# Patient Record
Sex: MALE | Race: WHITE | NOT HISPANIC OR LATINO | Employment: OTHER | URBAN - NONMETROPOLITAN AREA
[De-identification: names, ages, dates, MRNs, and addresses within clinical notes are randomized per-mention and may not be internally consistent; named-entity substitution may affect disease eponyms.]

---

## 2023-01-01 ENCOUNTER — APPOINTMENT (OUTPATIENT)
Dept: CT IMAGING | Facility: HOSPITAL | Age: 88
DRG: 64 | End: 2023-01-01
Payer: MEDICARE

## 2023-01-01 ENCOUNTER — APPOINTMENT (OUTPATIENT)
Dept: MRI IMAGING | Facility: HOSPITAL | Age: 88
DRG: 64 | End: 2023-01-01
Payer: MEDICARE

## 2023-01-01 ENCOUNTER — APPOINTMENT (OUTPATIENT)
Dept: CARDIOLOGY | Facility: HOSPITAL | Age: 88
DRG: 64 | End: 2023-01-01
Payer: MEDICARE

## 2023-01-01 ENCOUNTER — HOSPITAL ENCOUNTER (INPATIENT)
Facility: HOSPITAL | Age: 88
LOS: 2 days | DRG: 64 | End: 2023-02-15
Attending: EMERGENCY MEDICINE | Admitting: INTERNAL MEDICINE
Payer: MEDICARE

## 2023-01-01 ENCOUNTER — APPOINTMENT (OUTPATIENT)
Dept: GENERAL RADIOLOGY | Facility: HOSPITAL | Age: 88
DRG: 64 | End: 2023-01-01
Payer: MEDICARE

## 2023-01-01 VITALS
OXYGEN SATURATION: 87 % | BODY MASS INDEX: 23.84 KG/M2 | TEMPERATURE: 98.2 F | HEART RATE: 81 BPM | SYSTOLIC BLOOD PRESSURE: 124 MMHG | DIASTOLIC BLOOD PRESSURE: 90 MMHG | WEIGHT: 179.9 LBS | HEIGHT: 73 IN | RESPIRATION RATE: 8 BRPM

## 2023-01-01 DIAGNOSIS — Z74.09 IMPAIRED MOBILITY: ICD-10-CM

## 2023-01-01 DIAGNOSIS — Z74.09 IMPAIRED MOBILITY AND ADLS: ICD-10-CM

## 2023-01-01 DIAGNOSIS — I69.320 APHASIA DUE TO RECENT CEREBROVASCULAR ACCIDENT (CVA): ICD-10-CM

## 2023-01-01 DIAGNOSIS — Z78.9 IMPAIRED MOBILITY AND ADLS: ICD-10-CM

## 2023-01-01 DIAGNOSIS — R41.82 ALTERED MENTAL STATUS, UNSPECIFIED ALTERED MENTAL STATUS TYPE: ICD-10-CM

## 2023-01-01 DIAGNOSIS — I63.9 CEREBROVASCULAR ACCIDENT (CVA), UNSPECIFIED MECHANISM: Primary | ICD-10-CM

## 2023-01-01 DIAGNOSIS — R13.10 DYSPHAGIA, UNSPECIFIED TYPE: ICD-10-CM

## 2023-01-01 LAB
ABO GROUP BLD: NORMAL
ALBUMIN SERPL-MCNC: 3 G/DL (ref 3.5–5.2)
ALBUMIN SERPL-MCNC: 3.3 G/DL (ref 3.5–5.2)
ALBUMIN SERPL-MCNC: 3.6 G/DL (ref 3.5–5.2)
ALBUMIN/GLOB SERPL: 1.1 G/DL
ALBUMIN/GLOB SERPL: 1.3 G/DL
ALBUMIN/GLOB SERPL: 1.6 G/DL
ALP SERPL-CCNC: 79 U/L (ref 39–117)
ALP SERPL-CCNC: 83 U/L (ref 39–117)
ALP SERPL-CCNC: 95 U/L (ref 39–117)
ALT SERPL W P-5'-P-CCNC: 5 U/L (ref 1–41)
ALT SERPL W P-5'-P-CCNC: 5 U/L (ref 1–41)
ALT SERPL W P-5'-P-CCNC: 6 U/L (ref 1–41)
ANION GAP SERPL CALCULATED.3IONS-SCNC: 14 MMOL/L (ref 5–15)
ANION GAP SERPL CALCULATED.3IONS-SCNC: 14 MMOL/L (ref 5–15)
ANION GAP SERPL CALCULATED.3IONS-SCNC: 15 MMOL/L (ref 5–15)
ANISOCYTOSIS BLD QL: ABNORMAL
AST SERPL-CCNC: 20 U/L (ref 1–40)
AST SERPL-CCNC: 20 U/L (ref 1–40)
AST SERPL-CCNC: 28 U/L (ref 1–40)
BH CV ECHO MEAS - AO MAX PG: 3 MMHG
BH CV ECHO MEAS - AO MEAN PG: 1 MMHG
BH CV ECHO MEAS - AO ROOT DIAM: 3.3 CM
BH CV ECHO MEAS - AO V2 MAX: 86.2 CM/SEC
BH CV ECHO MEAS - AO V2 VTI: 13.5 CM
BH CV ECHO MEAS - AVA(I,D): 2.7 CM2
BH CV ECHO MEAS - EDV(MOD-SP4): 61.8 ML
BH CV ECHO MEAS - EF(MOD-SP4): 72.3 %
BH CV ECHO MEAS - ESV(MOD-SP4): 17.1 ML
BH CV ECHO MEAS - LA DIMENSION: 3.1 CM
BH CV ECHO MEAS - LAT PEAK E' VEL: 5.1 CM/SEC
BH CV ECHO MEAS - LV DIASTOLIC VOL/BSA (35-75): 30.1 CM2
BH CV ECHO MEAS - LV MAX PG: 2.5 MMHG
BH CV ECHO MEAS - LV MEAN PG: 1 MMHG
BH CV ECHO MEAS - LV SYSTOLIC VOL/BSA (12-30): 8.3 CM2
BH CV ECHO MEAS - LV V1 MAX: 79.1 CM/SEC
BH CV ECHO MEAS - LV V1 VTI: 11.8 CM
BH CV ECHO MEAS - LVOT AREA: 3.1 CM2
BH CV ECHO MEAS - LVOT DIAM: 2 CM
BH CV ECHO MEAS - MED PEAK E' VEL: 5.7 CM/SEC
BH CV ECHO MEAS - MV E MAX VEL: 70.3 CM/SEC
BH CV ECHO MEAS - RAP SYSTOLE: 5 MMHG
BH CV ECHO MEAS - RVSP: 26.3 MMHG
BH CV ECHO MEAS - SI(MOD-SP4): 21.8 ML/M2
BH CV ECHO MEAS - SV(LVOT): 37.1 ML
BH CV ECHO MEAS - SV(MOD-SP4): 44.7 ML
BH CV ECHO MEAS - TAPSE (>1.6): 1.01 CM
BH CV ECHO MEAS - TR MAX PG: 21.3 MMHG
BH CV ECHO MEAS - TR MAX VEL: 231 CM/SEC
BH CV ECHO MEASUREMENTS AVERAGE E/E' RATIO: 13.02
BH CV VAS BP LEFT ARM: NORMAL MMHG
BH CV XLRA - RV BASE: 5.8 CM
BH CV XLRA - RV LENGTH: 7.8 CM
BH CV XLRA - RV MID: 4.8 CM
BH CV XLRA - TDI S': 4.8 CM/SEC
BILIRUB SERPL-MCNC: 2.1 MG/DL (ref 0–1.2)
BILIRUB SERPL-MCNC: 2.4 MG/DL (ref 0–1.2)
BILIRUB SERPL-MCNC: 2.5 MG/DL (ref 0–1.2)
BLD GP AB SCN SERPL QL: NEGATIVE
BUN SERPL-MCNC: 25 MG/DL (ref 8–23)
BUN SERPL-MCNC: 31 MG/DL (ref 8–23)
BUN SERPL-MCNC: 32 MG/DL (ref 8–23)
BUN/CREAT SERPL: 28.4 (ref 7–25)
BUN/CREAT SERPL: 30.2 (ref 7–25)
BUN/CREAT SERPL: 31 (ref 7–25)
BURR CELLS BLD QL SMEAR: ABNORMAL
CALCIUM SPEC-SCNC: 8 MG/DL (ref 8.6–10.5)
CALCIUM SPEC-SCNC: 8.4 MG/DL (ref 8.6–10.5)
CALCIUM SPEC-SCNC: 8.5 MG/DL (ref 8.6–10.5)
CHLORIDE SERPL-SCNC: 107 MMOL/L (ref 98–107)
CHLORIDE SERPL-SCNC: 112 MMOL/L (ref 98–107)
CHLORIDE SERPL-SCNC: 114 MMOL/L (ref 98–107)
CHOLEST SERPL-MCNC: 95 MG/DL (ref 0–200)
CO2 SERPL-SCNC: 21 MMOL/L (ref 22–29)
CO2 SERPL-SCNC: 23 MMOL/L (ref 22–29)
CO2 SERPL-SCNC: 24 MMOL/L (ref 22–29)
CREAT SERPL-MCNC: 0.88 MG/DL (ref 0.76–1.27)
CREAT SERPL-MCNC: 1 MG/DL (ref 0.76–1.27)
CREAT SERPL-MCNC: 1.06 MG/DL (ref 0.76–1.27)
D DIMER PPP FEU-MCNC: >20 MCGFEU/ML (ref 0–0.85)
DEPRECATED RDW RBC AUTO: 54.9 FL (ref 37–54)
DEPRECATED RDW RBC AUTO: 55.7 FL (ref 37–54)
DEPRECATED RDW RBC AUTO: 55.8 FL (ref 37–54)
EGFRCR SERPLBLD CKD-EPI 2021: 68.8 ML/MIN/1.73
EGFRCR SERPLBLD CKD-EPI 2021: 73.8 ML/MIN/1.73
EGFRCR SERPLBLD CKD-EPI 2021: 84.3 ML/MIN/1.73
EOSINOPHIL # BLD MANUAL: 0.17 10*3/MM3 (ref 0–0.4)
EOSINOPHIL NFR BLD MANUAL: 1 % (ref 0.3–6.2)
ERYTHROCYTE [DISTWIDTH] IN BLOOD BY AUTOMATED COUNT: 14 % (ref 12.3–15.4)
ERYTHROCYTE [DISTWIDTH] IN BLOOD BY AUTOMATED COUNT: 14.1 % (ref 12.3–15.4)
ERYTHROCYTE [DISTWIDTH] IN BLOOD BY AUTOMATED COUNT: 14.2 % (ref 12.3–15.4)
GEN 5 2HR TROPONIN T REFLEX: 74 NG/L
GLOBULIN UR ELPH-MCNC: 2.1 GM/DL
GLOBULIN UR ELPH-MCNC: 2.7 GM/DL
GLOBULIN UR ELPH-MCNC: 2.8 GM/DL
GLUCOSE BLDC GLUCOMTR-MCNC: 112 MG/DL (ref 70–130)
GLUCOSE BLDC GLUCOMTR-MCNC: 112 MG/DL (ref 70–130)
GLUCOSE BLDC GLUCOMTR-MCNC: 120 MG/DL (ref 70–130)
GLUCOSE BLDC GLUCOMTR-MCNC: 127 MG/DL (ref 70–130)
GLUCOSE BLDC GLUCOMTR-MCNC: 133 MG/DL (ref 70–130)
GLUCOSE SERPL-MCNC: 109 MG/DL (ref 65–99)
GLUCOSE SERPL-MCNC: 136 MG/DL (ref 65–99)
GLUCOSE SERPL-MCNC: 143 MG/DL (ref 65–99)
HBA1C MFR BLD: 5.4 % (ref 4.8–5.6)
HCT VFR BLD AUTO: 39.7 % (ref 37.5–51)
HCT VFR BLD AUTO: 40.9 % (ref 37.5–51)
HCT VFR BLD AUTO: 44.9 % (ref 37.5–51)
HDLC SERPL-MCNC: 25 MG/DL (ref 40–60)
HGB BLD-MCNC: 13.2 G/DL (ref 13–17.7)
HGB BLD-MCNC: 13.6 G/DL (ref 13–17.7)
HGB BLD-MCNC: 14.8 G/DL (ref 13–17.7)
HOLD SPECIMEN: NORMAL
INR PPP: 1.24 (ref 0.91–1.09)
LDLC SERPL CALC-MCNC: 49 MG/DL (ref 0–100)
LDLC/HDLC SERPL: 1.91 {RATIO}
LEFT ATRIUM VOLUME: 95.7 ML
LYMPHOCYTES # BLD MANUAL: 0 10*3/MM3 (ref 0.7–3.1)
LYMPHOCYTES NFR BLD MANUAL: 9.9 % (ref 5–12)
MACROCYTES BLD QL SMEAR: ABNORMAL
MAXIMAL PREDICTED HEART RATE: 135 BPM
MCH RBC QN AUTO: 35.7 PG (ref 26.6–33)
MCH RBC QN AUTO: 35.9 PG (ref 26.6–33)
MCH RBC QN AUTO: 36 PG (ref 26.6–33)
MCHC RBC AUTO-ENTMCNC: 33 G/DL (ref 31.5–35.7)
MCHC RBC AUTO-ENTMCNC: 33.2 G/DL (ref 31.5–35.7)
MCHC RBC AUTO-ENTMCNC: 33.3 G/DL (ref 31.5–35.7)
MCV RBC AUTO: 107.9 FL (ref 79–97)
MCV RBC AUTO: 108.2 FL (ref 79–97)
MCV RBC AUTO: 108.5 FL (ref 79–97)
METAMYELOCYTES NFR BLD MANUAL: 2 % (ref 0–0)
MONOCYTES # BLD: 1.67 10*3/MM3 (ref 0.1–0.9)
MYELOCYTES NFR BLD MANUAL: 1 % (ref 0–0)
NEUTROPHILS # BLD AUTO: 14.52 10*3/MM3 (ref 1.7–7)
NEUTROPHILS NFR BLD MANUAL: 85.1 % (ref 42.7–76)
NEUTS BAND NFR BLD MANUAL: 1 % (ref 0–5)
PLAT MORPH BLD: NORMAL
PLATELET # BLD AUTO: 123 10*3/MM3 (ref 140–450)
PLATELET # BLD AUTO: 126 10*3/MM3 (ref 140–450)
PLATELET # BLD AUTO: 163 10*3/MM3 (ref 140–450)
PMV BLD AUTO: 10.2 FL (ref 6–12)
PMV BLD AUTO: 10.5 FL (ref 6–12)
PMV BLD AUTO: 10.7 FL (ref 6–12)
POTASSIUM SERPL-SCNC: 3 MMOL/L (ref 3.5–5.2)
POTASSIUM SERPL-SCNC: 3.5 MMOL/L (ref 3.5–5.2)
POTASSIUM SERPL-SCNC: 3.5 MMOL/L (ref 3.5–5.2)
PROT SERPL-MCNC: 5.4 G/DL (ref 6–8.5)
PROT SERPL-MCNC: 5.8 G/DL (ref 6–8.5)
PROT SERPL-MCNC: 6.3 G/DL (ref 6–8.5)
PROTHROMBIN TIME: 15.7 SECONDS (ref 11.8–14.8)
QT INTERVAL: 382 MS
QTC INTERVAL: 480 MS
RBC # BLD AUTO: 3.68 10*6/MM3 (ref 4.14–5.8)
RBC # BLD AUTO: 3.78 10*6/MM3 (ref 4.14–5.8)
RBC # BLD AUTO: 4.14 10*6/MM3 (ref 4.14–5.8)
RH BLD: POSITIVE
SODIUM SERPL-SCNC: 145 MMOL/L (ref 136–145)
SODIUM SERPL-SCNC: 149 MMOL/L (ref 136–145)
SODIUM SERPL-SCNC: 150 MMOL/L (ref 136–145)
STRESS TARGET HR: 115 BPM
T&S EXPIRATION DATE: NORMAL
TRIGL SERPL-MCNC: 111 MG/DL (ref 0–150)
TROPONIN T DELTA: 5 NG/L
TROPONIN T SERPL HS-MCNC: 69 NG/L
VARIANT LYMPHS NFR BLD MANUAL: 0 % (ref 19.6–45.3)
VLDLC SERPL-MCNC: 21 MG/DL (ref 5–40)
WBC MORPH BLD: NORMAL
WBC NRBC COR # BLD: 11.56 10*3/MM3 (ref 3.4–10.8)
WBC NRBC COR # BLD: 12.78 10*3/MM3 (ref 3.4–10.8)
WBC NRBC COR # BLD: 16.86 10*3/MM3 (ref 3.4–10.8)

## 2023-01-01 PROCEDURE — 25010000002 PERFLUTREN 6.52 MG/ML SUSPENSION: Performed by: INTERNAL MEDICINE

## 2023-01-01 PROCEDURE — 92523 SPEECH SOUND LANG COMPREHEN: CPT

## 2023-01-01 PROCEDURE — 93010 ELECTROCARDIOGRAM REPORT: CPT | Performed by: INTERNAL MEDICINE

## 2023-01-01 PROCEDURE — 85007 BL SMEAR W/DIFF WBC COUNT: CPT | Performed by: EMERGENCY MEDICINE

## 2023-01-01 PROCEDURE — 85379 FIBRIN DEGRADATION QUANT: CPT | Performed by: NURSE PRACTITIONER

## 2023-01-01 PROCEDURE — 92610 EVALUATE SWALLOWING FUNCTION: CPT

## 2023-01-01 PROCEDURE — 70450 CT HEAD/BRAIN W/O DYE: CPT

## 2023-01-01 PROCEDURE — 99285 EMERGENCY DEPT VISIT HI MDM: CPT

## 2023-01-01 PROCEDURE — 93306 TTE W/DOPPLER COMPLETE: CPT | Performed by: INTERNAL MEDICINE

## 2023-01-01 PROCEDURE — 82962 GLUCOSE BLOOD TEST: CPT

## 2023-01-01 PROCEDURE — 93005 ELECTROCARDIOGRAM TRACING: CPT | Performed by: EMERGENCY MEDICINE

## 2023-01-01 PROCEDURE — 97162 PT EVAL MOD COMPLEX 30 MIN: CPT | Performed by: PHYSICAL THERAPIST

## 2023-01-01 PROCEDURE — 83036 HEMOGLOBIN GLYCOSYLATED A1C: CPT | Performed by: INTERNAL MEDICINE

## 2023-01-01 PROCEDURE — 71275 CT ANGIOGRAPHY CHEST: CPT

## 2023-01-01 PROCEDURE — 84484 ASSAY OF TROPONIN QUANT: CPT | Performed by: EMERGENCY MEDICINE

## 2023-01-01 PROCEDURE — 0 IOPAMIDOL PER 1 ML: Performed by: EMERGENCY MEDICINE

## 2023-01-01 PROCEDURE — 99233 SBSQ HOSP IP/OBS HIGH 50: CPT | Performed by: INTERNAL MEDICINE

## 2023-01-01 PROCEDURE — 97166 OT EVAL MOD COMPLEX 45 MIN: CPT | Performed by: OCCUPATIONAL THERAPIST

## 2023-01-01 PROCEDURE — 70498 CT ANGIOGRAPHY NECK: CPT

## 2023-01-01 PROCEDURE — P9612 CATHETERIZE FOR URINE SPEC: HCPCS

## 2023-01-01 PROCEDURE — 85027 COMPLETE CBC AUTOMATED: CPT | Performed by: INTERNAL MEDICINE

## 2023-01-01 PROCEDURE — 36415 COLL VENOUS BLD VENIPUNCTURE: CPT

## 2023-01-01 PROCEDURE — 99222 1ST HOSP IP/OBS MODERATE 55: CPT | Performed by: NURSE PRACTITIONER

## 2023-01-01 PROCEDURE — 0 IOPAMIDOL PER 1 ML: Performed by: INTERNAL MEDICINE

## 2023-01-01 PROCEDURE — 80053 COMPREHEN METABOLIC PANEL: CPT | Performed by: INTERNAL MEDICINE

## 2023-01-01 PROCEDURE — 85610 PROTHROMBIN TIME: CPT | Performed by: EMERGENCY MEDICINE

## 2023-01-01 PROCEDURE — 71045 X-RAY EXAM CHEST 1 VIEW: CPT

## 2023-01-01 PROCEDURE — 97535 SELF CARE MNGMENT TRAINING: CPT

## 2023-01-01 PROCEDURE — 99233 SBSQ HOSP IP/OBS HIGH 50: CPT | Performed by: PSYCHIATRY & NEUROLOGY

## 2023-01-01 PROCEDURE — 99233 SBSQ HOSP IP/OBS HIGH 50: CPT | Performed by: PHYSICIAN ASSISTANT

## 2023-01-01 PROCEDURE — 93306 TTE W/DOPPLER COMPLETE: CPT

## 2023-01-01 PROCEDURE — 85025 COMPLETE CBC W/AUTO DIFF WBC: CPT | Performed by: EMERGENCY MEDICINE

## 2023-01-01 PROCEDURE — 97116 GAIT TRAINING THERAPY: CPT

## 2023-01-01 PROCEDURE — 0 POTASSIUM CHLORIDE 10 MEQ/100ML SOLUTION: Performed by: NURSE PRACTITIONER

## 2023-01-01 PROCEDURE — 92526 ORAL FUNCTION THERAPY: CPT

## 2023-01-01 PROCEDURE — 86850 RBC ANTIBODY SCREEN: CPT | Performed by: EMERGENCY MEDICINE

## 2023-01-01 PROCEDURE — 80053 COMPREHEN METABOLIC PANEL: CPT | Performed by: EMERGENCY MEDICINE

## 2023-01-01 PROCEDURE — 97110 THERAPEUTIC EXERCISES: CPT

## 2023-01-01 PROCEDURE — 70496 CT ANGIOGRAPHY HEAD: CPT

## 2023-01-01 PROCEDURE — 86901 BLOOD TYPING SEROLOGIC RH(D): CPT | Performed by: EMERGENCY MEDICINE

## 2023-01-01 PROCEDURE — 80061 LIPID PANEL: CPT | Performed by: INTERNAL MEDICINE

## 2023-01-01 PROCEDURE — 86900 BLOOD TYPING SEROLOGIC ABO: CPT | Performed by: EMERGENCY MEDICINE

## 2023-01-01 PROCEDURE — 70551 MRI BRAIN STEM W/O DYE: CPT

## 2023-01-01 PROCEDURE — 99291 CRITICAL CARE FIRST HOUR: CPT | Performed by: PSYCHIATRY & NEUROLOGY

## 2023-01-01 RX ORDER — POTASSIUM CHLORIDE 7.45 MG/ML
10 INJECTION INTRAVENOUS
Status: DISCONTINUED | OUTPATIENT
Start: 2023-01-01 | End: 2023-01-01

## 2023-01-01 RX ORDER — LORAZEPAM 1 MG/1
1 TABLET ORAL
Status: DISCONTINUED | OUTPATIENT
Start: 2023-01-01 | End: 2023-02-16 | Stop reason: HOSPADM

## 2023-01-01 RX ORDER — ATROPINE SULFATE 10 MG/ML
2 SOLUTION/ DROPS OPHTHALMIC 2 TIMES DAILY PRN
Status: DISCONTINUED | OUTPATIENT
Start: 2023-01-01 | End: 2023-02-16 | Stop reason: HOSPADM

## 2023-01-01 RX ORDER — LORAZEPAM 0.5 MG/1
0.5 TABLET ORAL
Status: DISCONTINUED | OUTPATIENT
Start: 2023-01-01 | End: 2023-02-16 | Stop reason: HOSPADM

## 2023-01-01 RX ORDER — MORPHINE SULFATE 20 MG/ML
20 SOLUTION ORAL
Status: DISCONTINUED | OUTPATIENT
Start: 2023-01-01 | End: 2023-02-16 | Stop reason: HOSPADM

## 2023-01-01 RX ORDER — DEXTROSE MONOHYDRATE 50 MG/ML
100 INJECTION, SOLUTION INTRAVENOUS CONTINUOUS
Status: DISCONTINUED | OUTPATIENT
Start: 2023-01-01 | End: 2023-01-01

## 2023-01-01 RX ORDER — SODIUM CHLORIDE 0.9 % (FLUSH) 0.9 %
10 SYRINGE (ML) INJECTION AS NEEDED
Status: DISCONTINUED | OUTPATIENT
Start: 2023-01-01 | End: 2023-02-16 | Stop reason: HOSPADM

## 2023-01-01 RX ORDER — LORAZEPAM 2 MG/ML
1 INJECTION INTRAMUSCULAR
Status: DISCONTINUED | OUTPATIENT
Start: 2023-01-01 | End: 2023-02-16 | Stop reason: HOSPADM

## 2023-01-01 RX ORDER — ASPIRIN 81 MG/1
81 TABLET, CHEWABLE ORAL 2 TIMES DAILY
COMMUNITY

## 2023-01-01 RX ORDER — ACETAMINOPHEN 325 MG/1
650 TABLET ORAL EVERY 6 HOURS PRN
COMMUNITY

## 2023-01-01 RX ORDER — LORAZEPAM 2 MG/ML
0.5 INJECTION INTRAMUSCULAR
Status: DISCONTINUED | OUTPATIENT
Start: 2023-01-01 | End: 2023-02-16 | Stop reason: HOSPADM

## 2023-01-01 RX ORDER — SODIUM CHLORIDE 0.9 % (FLUSH) 0.9 %
10 SYRINGE (ML) INJECTION EVERY 12 HOURS SCHEDULED
Status: DISCONTINUED | OUTPATIENT
Start: 2023-01-01 | End: 2023-02-16 | Stop reason: HOSPADM

## 2023-01-01 RX ORDER — ACETAMINOPHEN 325 MG/1
650 TABLET ORAL EVERY 4 HOURS PRN
Status: DISCONTINUED | OUTPATIENT
Start: 2023-01-01 | End: 2023-02-16 | Stop reason: HOSPADM

## 2023-01-01 RX ORDER — ONDANSETRON 2 MG/ML
4 INJECTION INTRAMUSCULAR; INTRAVENOUS EVERY 6 HOURS PRN
Status: DISCONTINUED | OUTPATIENT
Start: 2023-01-01 | End: 2023-02-16 | Stop reason: HOSPADM

## 2023-01-01 RX ORDER — DEXTROSE AND SODIUM CHLORIDE 5; .9 G/100ML; G/100ML
75 INJECTION, SOLUTION INTRAVENOUS CONTINUOUS
Status: DISCONTINUED | OUTPATIENT
Start: 2023-01-01 | End: 2023-01-01

## 2023-01-01 RX ORDER — LORAZEPAM 2 MG/ML
1 CONCENTRATE ORAL
Status: DISCONTINUED | OUTPATIENT
Start: 2023-01-01 | End: 2023-02-16 | Stop reason: HOSPADM

## 2023-01-01 RX ORDER — LORAZEPAM 1 MG/1
2 TABLET ORAL
Status: DISCONTINUED | OUTPATIENT
Start: 2023-01-01 | End: 2023-02-16 | Stop reason: HOSPADM

## 2023-01-01 RX ORDER — LORAZEPAM 2 MG/ML
2 CONCENTRATE ORAL
Status: DISCONTINUED | OUTPATIENT
Start: 2023-01-01 | End: 2023-02-16 | Stop reason: HOSPADM

## 2023-01-01 RX ORDER — AMLODIPINE BESYLATE 10 MG/1
10 TABLET ORAL DAILY
COMMUNITY

## 2023-01-01 RX ORDER — LORAZEPAM 2 MG/ML
0.5 CONCENTRATE ORAL
Status: DISCONTINUED | OUTPATIENT
Start: 2023-01-01 | End: 2023-02-16 | Stop reason: HOSPADM

## 2023-01-01 RX ORDER — ACETAMINOPHEN 650 MG/1
650 SUPPOSITORY RECTAL EVERY 4 HOURS PRN
Status: DISCONTINUED | OUTPATIENT
Start: 2023-01-01 | End: 2023-02-16 | Stop reason: HOSPADM

## 2023-01-01 RX ORDER — LORAZEPAM 2 MG/ML
2 INJECTION INTRAMUSCULAR
Status: DISCONTINUED | OUTPATIENT
Start: 2023-01-01 | End: 2023-02-16 | Stop reason: HOSPADM

## 2023-01-01 RX ORDER — ASPIRIN 81 MG/1
81 TABLET, CHEWABLE ORAL DAILY
Status: ON HOLD | COMMUNITY
End: 2023-01-01 | Stop reason: DRUGHIGH

## 2023-01-01 RX ORDER — SODIUM CHLORIDE 9 MG/ML
40 INJECTION, SOLUTION INTRAVENOUS AS NEEDED
Status: DISCONTINUED | OUTPATIENT
Start: 2023-01-01 | End: 2023-02-16 | Stop reason: HOSPADM

## 2023-01-01 RX ORDER — ACETAMINOPHEN 160 MG/5ML
650 SOLUTION ORAL EVERY 4 HOURS PRN
Status: DISCONTINUED | OUTPATIENT
Start: 2023-01-01 | End: 2023-02-16 | Stop reason: HOSPADM

## 2023-01-01 RX ORDER — SCOLOPAMINE TRANSDERMAL SYSTEM 1 MG/1
1 PATCH, EXTENDED RELEASE TRANSDERMAL
Status: DISCONTINUED | OUTPATIENT
Start: 2023-01-01 | End: 2023-02-16 | Stop reason: HOSPADM

## 2023-01-01 RX ORDER — ASPIRIN 300 MG/1
300 SUPPOSITORY RECTAL DAILY
Status: DISCONTINUED | OUTPATIENT
Start: 2023-01-01 | End: 2023-01-01

## 2023-01-01 RX ORDER — DIPHENOXYLATE HYDROCHLORIDE AND ATROPINE SULFATE 2.5; .025 MG/1; MG/1
1 TABLET ORAL
Status: DISCONTINUED | OUTPATIENT
Start: 2023-01-01 | End: 2023-02-16 | Stop reason: HOSPADM

## 2023-01-01 RX ORDER — METOPROLOL SUCCINATE 25 MG/1
25 TABLET, EXTENDED RELEASE ORAL DAILY
COMMUNITY

## 2023-01-01 RX ORDER — ATORVASTATIN CALCIUM 40 MG/1
80 TABLET, FILM COATED ORAL NIGHTLY
Status: DISCONTINUED | OUTPATIENT
Start: 2023-01-01 | End: 2023-01-01

## 2023-01-01 RX ORDER — DEXTROSE, SODIUM CHLORIDE, AND POTASSIUM CHLORIDE 5; .45; .15 G/100ML; G/100ML; G/100ML
100 INJECTION INTRAVENOUS CONTINUOUS
Status: DISCONTINUED | OUTPATIENT
Start: 2023-01-01 | End: 2023-01-01

## 2023-01-01 RX ORDER — ASPIRIN 325 MG
325 TABLET ORAL DAILY
Status: DISCONTINUED | OUTPATIENT
Start: 2023-01-01 | End: 2023-01-01

## 2023-01-01 RX ADMIN — Medication 10 ML: at 20:45

## 2023-01-01 RX ADMIN — POTASSIUM CHLORIDE 10 MEQ: 7.46 INJECTION, SOLUTION INTRAVENOUS at 10:46

## 2023-01-01 RX ADMIN — POTASSIUM CHLORIDE 10 MEQ: 7.46 INJECTION, SOLUTION INTRAVENOUS at 10:06

## 2023-01-01 RX ADMIN — Medication 10 ML: at 20:10

## 2023-01-01 RX ADMIN — POTASSIUM CHLORIDE 10 MEQ: 7.46 INJECTION, SOLUTION INTRAVENOUS at 11:41

## 2023-01-01 RX ADMIN — ASPIRIN 300 MG: 300 SUPPOSITORY RECTAL at 10:06

## 2023-01-01 RX ADMIN — ASPIRIN 300 MG: 300 SUPPOSITORY RECTAL at 11:18

## 2023-01-01 RX ADMIN — IOPAMIDOL 125 ML: 755 INJECTION, SOLUTION INTRAVENOUS at 09:47

## 2023-01-01 RX ADMIN — IOPAMIDOL 100 ML: 755 INJECTION, SOLUTION INTRAVENOUS at 16:35

## 2023-01-01 RX ADMIN — ASPIRIN 300 MG: 300 SUPPOSITORY RECTAL at 18:06

## 2023-01-01 RX ADMIN — DEXTROSE MONOHYDRATE 50 ML/HR: 50 INJECTION, SOLUTION INTRAVENOUS at 14:52

## 2023-01-01 RX ADMIN — DEXTROSE AND SODIUM CHLORIDE 75 ML/HR: 5; 900 INJECTION, SOLUTION INTRAVENOUS at 18:06

## 2023-01-01 RX ADMIN — PERFLUTREN 13.04 MG: 6.52 INJECTION, SUSPENSION INTRAVENOUS at 16:53

## 2023-01-01 RX ADMIN — DEXTROSE AND SODIUM CHLORIDE 75 ML/HR: 5; 900 INJECTION, SOLUTION INTRAVENOUS at 07:01

## 2023-02-13 PROBLEM — I63.9 CEREBROVASCULAR ACCIDENT (CVA), UNSPECIFIED MECHANISM: Status: ACTIVE | Noted: 2023-01-01

## 2023-02-13 NOTE — H&P
[facility] ER Medicine Services  HISTORY AND PHYSICAL    Date of Admission: 2/13/2023  Primary Care Physician: Gopi Chino MD    Subjective   Primary Historian: Patient's wife    Chief Complaint: Confusion, right arm weakness    History of Present Illness  The patient is an 87-year-old man with a past medical history of essential hypertension and recent right hip fracture repair 2 weeks prior to presentation, who was having acute rehab at the [facility] in [place].  He presented with sudden onset of right arm weakness and aphasia this morning.  History was obtained from review of ER records and patient's wife as patient is aphasic at the time of my evaluation.    This morning, at the [facility], patient was noticed to have sudden onset of confusion and right arm weakness around 7:15 AM today.  He was brought to our facility and his CT of the brain showed features consistent with a left MCA stroke.  He was reviewed by neurology and was deemed not to be a tPA candidate.  CTA of the head and neck showed poor progression of contrast likely secondary to low ejection fraction.  He was recommended for admission.    Review of Systems   Otherwise complete ROS reviewed and negative except as mentioned in the HPI.    Past Medical History:   Past Medical History:   Diagnosis Date   • Hypertension      Past Surgical History:  Past Surgical History:   Procedure Laterality Date   • ORIF FEMUR FRACTURE       Social History:  reports that he has never smoked. He has never been exposed to tobacco smoke. He has never used smokeless tobacco. He reports that he does not drink alcohol and does not use drugs.    Family History: family history includes No Known Problems in his father and mother.      Allergies:  Allergies   Allergen Reactions   • Penicillins Unknown - Low Severity       Medications:  Prior to Admission medications    No known  Medications     I have  "utilized all available immediate resources to obtain, update, or review the patient's current medications (including all prescriptions, over-the-counter products, herbals, cannabis/cannabidiol products, and vitamin/mineral/dietary (nutritional) supplements).    Objective     Vital Signs: /74 (BP Location: Left arm, Patient Position: Lying)   Pulse 77   Temp 97.8 °F (36.6 °C) (Axillary)   Resp 16   Ht 185.4 cm (73\")   Wt 81.6 kg (179 lb 14.4 oz)   SpO2 96%   BMI 23.73 kg/m²   Physical Exam  Constitutional:       General: He is not in acute distress.     Appearance: He is well-developed. He is not diaphoretic.   HENT:      Head: Normocephalic and atraumatic.   Eyes:      General: No scleral icterus.     Conjunctiva/sclera: Conjunctivae normal.      Pupils: Pupils are equal, round, and reactive to light.   Neck:      Thyroid: No thyromegaly.      Vascular: No JVD.      Trachea: No tracheal deviation.   Cardiovascular:      Rate and Rhythm: Normal rate and regular rhythm.      Heart sounds: Normal heart sounds. No murmur heard.    No friction rub. No gallop.   Pulmonary:      Effort: Pulmonary effort is normal. No respiratory distress.      Breath sounds: Normal breath sounds. No stridor. No wheezing or rales.   Abdominal:      General: Bowel sounds are normal. There is no distension.      Palpations: Abdomen is soft. There is no mass.      Tenderness: There is no abdominal tenderness. There is no guarding or rebound.      Hernia: No hernia is present.   Musculoskeletal:         General: No tenderness or deformity. Normal range of motion.      Right lower leg: No edema.      Left lower leg: No edema.   Lymphadenopathy:      Cervical: No cervical adenopathy.   Skin:     General: Skin is warm and dry.      Coloration: Skin is not pale.      Findings: No erythema or rash.   Neurological:      Motor: No abnormal muscle tone.      Comments: Expressive and receptive aphasia noted.  Confused.  Tongue deviated to " left side.  Right arm and leg weakness noted-grade 2/5 power.  Normal power in left arm and leg.   Psychiatric:      Comments: Expressive and receptive aphasia noted.  Confused.        Results Reviewed:  Lab Results (last 24 hours)     Procedure Component Value Units Date/Time    POC Glucose Once [[id number]]  (Normal) Collected: 02/13/23 1822    Specimen: Blood Updated: 02/13/23 1832     Glucose 120 mg/dL      Comment: : sarika Bright MeganMeter ID: [id number]       [place] Draw [[id number]] Collected: 02/13/23 0902    Specimen: Blood Updated: 02/13/23 1315    Narrative:      The following orders were created for panel order [place] Draw.  Procedure                               Abnormality         Status                     ---------                               -----------         ------                     Green Top (Gel)[[id number]]                                  Final result               Lavender Top[[id number]]                                                                Red Top[[id number]]                                          Final result               Gray Top[[id number]]                                         Final result               Light Blue Top[[id number]]                                                                Please view results for these tests on the individual orders.    [place] Top [[id number]] Collected: 02/13/23 0902    Specimen: Blood Updated: 02/13/23 1315     Extra Tube Hold for add-ons.     Comment: Auto resulted.       High Sensitivity Troponin T 2Hr [[id number]]  (Abnormal) Collected: 02/13/23 1106    Specimen: Blood Updated: 02/13/23 1138     HS Troponin T 74 ng/L      Troponin T Delta 5 ng/L     Narrative:      High Sensitive Troponin T Reference Range:  <10.0 ng/L- Negative Female for AMI  <15.0 ng/L- Negative Male for AMI  >=10 - Abnormal Female indicating possible myocardial injury.  >=15 - Abnormal Male indicating possible myocardial injury.   Clinicians would have  to utilize clinical acumen, EKG, Troponin, and serial changes to determine if it is an Acute Myocardial Infarction or myocardial injury due to an underlying chronic condition.         High Sensitivity Troponin T [[id number]]  (Abnormal) Collected: 02/13/23 0902    Specimen: Blood Updated: 02/13/23 1050     HS Troponin T 69 ng/L     Narrative:      High Sensitive Troponin T Reference Range:  <10.0 ng/L- Negative Female for AMI  <15.0 ng/L- Negative Male for AMI  >=10 - Abnormal Female indicating possible myocardial injury.  >=15 - Abnormal Male indicating possible myocardial injury.   Clinicians would have to utilize clinical acumen, EKG, Troponin, and serial changes to determine if it is an Acute Myocardial Infarction or myocardial injury due to an underlying chronic condition.         Green Top (Gel) [[id number]] Collected: 02/13/23 0902    Specimen: Blood Updated: 02/13/23 1045     Extra Tube Hold for add-ons.     Comment: Auto resulted.       POC Glucose Once [[id number]]  (Normal) Collected: 02/13/23 1018    Specimen: Blood Updated: 02/13/23 1031     Glucose 127 mg/dL      Comment: : rgramlis Gramlisch RobertMeter ID: [id number]       Red Top [[id number]] Collected: 02/13/23 0902    Specimen: Blood Updated: 02/13/23 1016     Extra Tube Hold for add-ons.     Comment: Auto resulted.       CBC & Differential [[id number]]  (Abnormal) Collected: 02/13/23 0902    Specimen: Blood Updated: 02/13/23 0950    Narrative:      The following orders were created for panel order CBC & Differential.  Procedure                               Abnormality         Status                     ---------                               -----------         ------                     CBC Auto Differential[[id number]]        Abnormal            Final result                 Please view results for these tests on the individual orders.    CBC Auto Differential [[id number]]  (Abnormal) Collected: 02/13/23 0902    Specimen: Blood Updated:  02/13/23 0950     WBC 16.86 10*3/mm3      RBC 4.14 10*6/mm3      Hemoglobin 14.8 g/dL      Hematocrit 44.9 %      .5 fL      MCH 35.7 pg      MCHC 33.0 g/dL      RDW 14.2 %      RDW-SD 55.8 fl      MPV 10.2 fL      Platelets 163 10*3/mm3     Manual Differential [[id number]]  (Abnormal) Collected: 02/13/23 0902    Specimen: Blood Updated: 02/13/23 0950     Neutrophil % 85.1 %      Lymphocyte % 0.0 %      Monocyte % 9.9 %      Eosinophil % 1.0 %      Bands %  1.0 %      Metamyelocyte % 2.0 %      Myelocyte % 1.0 %      Neutrophils Absolute 14.52 10*3/mm3      Lymphocytes Absolute 0.00 10*3/mm3      Monocytes Absolute 1.67 10*3/mm3      Eosinophils Absolute 0.17 10*3/mm3      Anisocytosis Slight/1+     Crenated RBC's Slight/1+     Macrocytes Slight/1+     WBC Morphology Normal     Platelet Morphology Normal    Comprehensive Metabolic Panel [[id number]]  (Abnormal) Collected: 02/13/23 0902    Specimen: Blood Updated: 02/13/23 0939     Glucose 143 mg/dL      BUN 32 mg/dL      Creatinine 1.06 mg/dL      Sodium 145 mmol/L      Potassium 3.5 mmol/L      Comment: Slight hemolysis detected by analyzer. Results may be affected.        Chloride 107 mmol/L      CO2 23.0 mmol/L      Calcium 8.5 mg/dL      Total Protein 6.3 g/dL      Albumin 3.6 g/dL      ALT (SGPT) 5 U/L      AST (SGOT) 20 U/L      Alkaline Phosphatase 95 U/L      Total Bilirubin 2.5 mg/dL      Globulin 2.7 gm/dL      A/G Ratio 1.3 g/dL      BUN/Creatinine Ratio 30.2     Anion Gap 15.0 mmol/L      eGFR 68.8 mL/min/1.73     Narrative:      GFR Normal >60  Chronic Kidney Disease <60  Kidney Failure <15    The GFR formula is only valid for adults with stable renal function between ages 18 and 70.    Protime-INR [[id number]]  (Abnormal) Collected: 02/13/23 0902    Specimen: Blood Updated: 02/13/23 0928     Protime 15.7 Seconds      INR 1.24        Imaging Results (Last 24 Hours)     Procedure Component Value Units Date/Time    MRI Brain Without Contrast  [[id number]] Collected: 02/13/23 1828     Updated: 02/13/23 1834    Narrative:      EXAMINATION: MRI BRAIN WO CONTRAST-     2/13/2023 5:38 PM CST     HISTORY: Neuro deficit, acute, stroke suspected; I63.9-Cerebral  infarction, unspecified; R41.82-Altered mental status, unspecified;  R13.10-Dysphagia, unspecified.     Noncontrast MR imaging of the brain.  Axial and sagittal sequences.     Large acute left MCA infarct involving an area measuring approximately 9  x 3 cm.     Small cortical infarct in the right frontal lobe involving an area  measuring approximately 2 x 1 cm.     No hemorrhage or mass effect.  No midline shift.     Ventricle size is normal.     The visualized paranasal sinuses are clear.     There is prominent diffuse atrophy and mild small vessel disease.     Summary:  1. Large left parieto-occipital and small right frontal acute infarct.  2. No hemorrhage or midline shift.              This report was finalized on 02/13/2023 18:31 by Dr. Pradeep Parra MD.    CT Angiogram Head w AI Analysis of LVO [[id number]] Collected: 02/13/23 1009     Updated: 02/13/23 1017    Narrative:      CT ANGIOGRAM HEAD W AI ANALYSIS OF LVO- 2/13/2023 9:04 AM CST     HISTORY: Neuro deficit, acute stroke suspected, right-sided weakness  with speech impediment     COMPARISON: Nonenhanced CT head exam 02/13/2023     DOSE LENGTH PRODUCT: 161 mGy cm. Automated exposure control was also  utilized to decrease patient radiation dose.     TECHNIQUE: Axial images of the brain are performed following IV  contrast. 2-D and maximal intensity projectional reconstructed images  are reviewed. Poor contrast bolus related to poor cardiac output. Mild  motion artifact. AI analysis of LVO was utilized.        FINDINGS:  Mild contrast enhancement of the distal internal carotid  arteries with mild enhancement of the anterior and middle cerebral  arteries. A discrete central thrombus is not localized within these  vessels. There is minimal if any  contrast seen within the posterior  cerebral and small caliber basilar artery resulting in diminished  assessment. The RAPID analysis suggests decreased enhancement of the  left MCA branches.       Impression:      1. Suboptimal contrast enhancement related to poor cardiac output and a  summation artifact. Findings concerning for decreased flow to the  branches of the left MCA artery. Please refer to dictation above.  This report was finalized on 02/13/2023 10:14 by Dr. Alanna Rivera MD.    CT Angiogram Neck [[id number]] Collected: 02/13/23 1002     Updated: 02/13/23 1012    Narrative:      CT ANGIOGRAM NECK- 2/13/2023 9:04 AM CST     HISTORY: Neuro deficit, acute stroke suspected, right paralysis with  speech impediment     COMPARISON: None     DOSE LENGTH PRODUCT: 160 mGy cm. Automated exposure control was also  utilized to decrease patient radiation dose.     TECHNIQUE: Axial images of the neck are performed following IV contrast.  2-D and maximal intensity projectional reconstructed images are  reviewed.     FINDINGS:  Contrast is present within the left subclavian,  brachiocephalic veins and SVC with mild contrast suspected within the  arch of the thoracic aorta. Findings likely represent sequelae of poor  cardiac output. Patient with persistent motion. Image quality degraded  for these reasons.     There is moderate calcification within the arch of the thoracic aorta.  No significant contrast present within the common, internal, and  external carotid arteries to assess severity of carotid artery stenosis.  There is moderate calcified plaque of the left carotid bulb extending  into the proximal left internal carotid artery which results in at least  50% stenosis of the carotid bulb. Only mild calcified plaque of the  right carotid bulb. Hypoplasia suspected of the left vertebral artery.  No significant vertebral artery contrast enhancement during the exam. No  convincing aneurysm localized.     No soft tissue  mass or pathologic lymphadenopathy identified within the  neck.     Calcified granuloma the left lung apex. Moderate to advanced  degenerative change of the cervical spine.       Impression:      1. Imaging is degraded by the patient's poor cardiac output and timing  of contrast bolus as well as repetitive patient motion artifact. Poor  enhancement of the extracranial carotid and vertebral arteries during  image acquisition. Moderate calcified plaque of the left carotid bulb  resulting in at least 50% stenosis of the bulb. Only mild calcified  plaque of the right carotid bulb. Moderate atherosclerosis of the normal  caliber thoracic aorta.  This report was finalized on 02/13/2023 10:09 by Dr. Alanna Rivera MD.    CT Head Without Contrast Stroke Protocol [[id number]] Collected: 02/13/23 0951     Updated: 02/13/23 1005    Narrative:      CT HEAD WO CONTRAST STROKE PROTOCOL- 2/13/2023 9:16 AM CST     HISTORY: Neuro deficit, acute, stroke suspected, right sided paralysis  and speech impediment     COMPARISON: None     DOSE LENGTH PRODUCT: 793 mGy cm. Automated exposure control was also  utilized to decrease patient radiation dose.     TECHNIQUE: Axial images the brain are obtained without contrast     FINDINGS:  There is questionable hyperdense left M2/3 cerebral artery  which may be seen with left MCA ischemia. Questionable loss of the  gray-white matter differentiation along the left insular cortex. There  is no intracranial hemorrhage. Old ischemic changes of the right frontal  as well as right parieto-occipital region. There are chronic small  vessel ischemic changes. No extra-axial hematoma. No mass effect at this  time. Mild generalized volume loss.     Visible paranasal sinuses and mastoid air cells are well-aerated.       Impression:      1. Findings concerning for early left MCA ischemia with a hyperdense  left MCA sign and loss of gray-white matter differentiation along the  left insular cortex. No  intracranial hemorrhage or prominent mass  effect.  2. Old ischemic changes right cerebral hemisphere with chronic small  vessel ischemia.     Comments: Findings called to Dr. Bradford in the ER at 10:00 AM on  02/13/2023  This report was finalized on 02/13/2023 10:02 by Dr. Alanna Rivera MD.    XR Chest 1 View [[id number]] Collected: 02/13/23 0939     Updated: 02/13/23 0943    Narrative:      EXAMINATION: XR CHEST 1 VW- 2/13/2023 9:39 AM CST     HISTORY: Acute Stroke Protocol (Onset < 12 hrs).     REPORT: A frontal view of the chest was obtained.     COMPARISON: There are no correlative imaging studies for comparison.     The lungs are clear, mildly hypoexpanded. There is ectasia of the  thoracic aorta, exaggerated by mild rotation of the patient. Heart size  is normal. No pneumothorax or pleural effusion is identified. The  osseous structures and upper abdomen appear unremarkable.       Impression:      No acute cardiopulmonary abnormality.  This report was finalized on 02/13/2023 09:40 by Dr. Jose E Barreto MD.        I have personally reviewed and interpreted the radiology studies and ECG obtained at time of admission.     Assessment / Plan   Assessment:   Active Hospital Problems    Diagnosis    • **Cerebrovascular accident (CVA), unspecified mechanism (HCC)    Essential hypertension  Leukocytosis  Hypocalcemia  Recent hip surgery and right hip fracture repair    Treatment Plan  The patient will be admitted to my service here at [facility].  Neurology consultation input appreciated.  Speech and swallow therapy.    Start oral or rectal aspirin if patient not cleared for speech.  Start atorvastatin-high-dose.  Echocardiogram, MRI of the brain.    Systolic blood pressure goal less than 220 mmHg and greater than 120 mmHg.    PT/OT consultations    DVT prophylaxis with SCDs    CODE STATUS is DNR/DNI as per patient's wife    Medical Decision Making  Number and Complexity of problems: 1 acute  life-threatening high complexity problem of acute CVA.  Differential Diagnosis: None    Conditions and Status        Condition is unchanged.     MDM Data  External documents reviewed: ER records  Cardiac tracing (EKG, telemetry) interpretation: Telemetry showing sinus rhythm  Radiology interpretation: CT of the head reviewed by me  Labs reviewed: CBC, BMP reviewed by me  Any tests that were considered but not ordered: None     Decision rules/scores evaluated (example [id number], [place], etc): Not applicable     Discussed with: Patient's wife     Care Planning  Shared decision making: Patient's wife  Code status and discussions: DNR/DNI    Disposition  Social Determinants of Health that impact treatment or disposition: None  Estimated length of stay is 4 to 5 days    I confirmed that the patient's advanced care plan is present, code status is documented, and a surrogate decision maker is listed in the patient's medical record.     The patient's surrogate decision maker is patient's wife    The patient was seen and examined by me on 2/13/2023 at 1 PM .    Electronically signed by Coni Moreno MD, 02/13/23, 21:21 CST.

## 2023-02-13 NOTE — PLAN OF CARE
Goal Outcome Evaluation:  Plan of Care Reviewed With: patient, spouse, caregiver        Progress: no change    Patient seen for a bedside swallow evaluation on this date due to new onset of stroke symptoms. Patient wife present. Patient wife reports he called her last night from the rehab facility. The patient displayed no slurred speech or confusion during that time. Earlier today, the facility found the patient unable to communicate and transported for further evaluation. Patient did not indicate any pain. Patient unable to follow commands. Left sided lingual deviation noted. ST presented a small ice chip to elicit a swallow response. No response noted. Patient is not able to participate with oral feeding at this time. NPO is recommended with ST to follow for further swallow evaluation and recommendations. Patient wife expressed understanding. ST communicated results to RN and hospitalist as well. Oral care should be provided every 4 hours with suction. ST will continue to re-assess.   Clotilde Salgado, SLP 2/13/2023 13:30 CST

## 2023-02-13 NOTE — CONSULTS
"        Neurology Consult Note    Referring Provider: Dr. Bradford  Reason for Consultation: code stroke      History of present illness:      87 year old male who is a resident of the [facility] in [facility].  According to nursing staff they rounded on him at 7 AM.  He was reported to be \"okay.\"  It is unclear whether they woke him up and spoke with him at that time.  Then around 715 they made rounds again and found that he had a left gaze preference and right hemiparesis with altered mentation.  I was able to speak to his daughter as well.  His daughter told me that he had a broken hip repair approximately 2 weeks ago.  This was done in [place].  He was at the nursing facility to rehabilitate.  He arrived in our medical facility as a possible code stroke.  He was found to have severe deficits consistent with a left MCA event.  A stat head CT without contrast showed hypoattenuation in the right frontal lobe.  The left MCA region may have some hypoattenuation in the gray-white matter border.  I discussed the risk and benefits of tPA with the patient's daughter.  Unfortunately he was likely not a candidate as he had a recent major hip surgery and a noncompressible site.  I think the risk of bleeding would exceed that of any benefit.  She expressed understanding and agreed.  CT angiography was attempted; however, after contrast was administered the contrast stayed in the aorta and showed little progression into the cerebrovascular system.  It is my believe this may have been secondary to a low ejection fraction.  Regardless, I do see some contrast in the [place] of [place] and saw no obvious signs of a large vessel occlusion.    He does have a significant other named Celena Muniz.  He has a daughter named Joy Kathleen who resides in [facility].  I updated his daughter over the phone.    No past medical history on file.  No significant past medical history with exception of the hip fracture.  She reports he " does not take any medications.  Allergies   Allergen Reactions   • Penicillins Unknown - Low Severity     No current facility-administered medications on file prior to encounter.     No current outpatient medications on file prior to encounter.       Social History     Socioeconomic History   • Marital status: Single     No family history on file.        Vital Signs   Heart Rate:  [98] 98  Resp:  [14] 14  BP: (96)/(73) 96/73    General Exam:  Head:  Normocephalic, atraumatic  HEENT:  Neck supple  Fundoscopic Exam:  No signs of disc edema  CVS:  Regular rate and rhythm.  No murmurs  Carotid Examination:  No bruits  Lungs:  Clear to auscultation  Abdomen:  Nontender, Nondistended  Extremities:  No signs of peripheral edema  Skin:  No rashes    Neurologic Exam:  Awake.  Alert.  Aphasic.  Global aphasia.  Occasional command following.  Decrease blink to threat from right.  Pupils equal.  Left gaze preference.  Corneal gag reflex is intact.  Right lower facial droop.  Does not protrude tongue as has poor command following    Left arm and leg have full strength.  3 out of 5 strength on right arm and leg with an everted foot suggestive of weakness in the right lower extremity  Upgoing toe on right  Sensory examination reveals withdrawal from the right arm and leg to noxious stimulation  Coordination gait examination show no signs of active tremor      Results Review:  Lab Results (last 24 hours)     Procedure Component Value Units Date/Time    Comprehensive Metabolic Panel [[id number]]  (Abnormal) Collected: 02/13/23 0902    Specimen: Blood Updated: 02/13/23 0939     Glucose 143 mg/dL      BUN 32 mg/dL      Creatinine 1.06 mg/dL      Sodium 145 mmol/L      Potassium 3.5 mmol/L      Comment: Slight hemolysis detected by analyzer. Results may be affected.        Chloride 107 mmol/L      CO2 23.0 mmol/L      Calcium 8.5 mg/dL      Total Protein 6.3 g/dL      Albumin 3.6 g/dL      ALT (SGPT) 5 U/L      AST (SGOT) 20 U/L       Alkaline Phosphatase 95 U/L      Total Bilirubin 2.5 mg/dL      Globulin 2.7 gm/dL      A/G Ratio 1.3 g/dL      BUN/Creatinine Ratio 30.2     Anion Gap 15.0 mmol/L      eGFR 68.8 mL/min/1.73     Narrative:      GFR Normal >60  Chronic Kidney Disease <60  Kidney Failure <15    The GFR formula is only valid for adults with stable renal function between ages 18 and 70.    Protime-INR [[id number]]  (Abnormal) Collected: 02/13/23 0902    Specimen: Blood Updated: 02/13/23 0928     Protime 15.7 Seconds      INR 1.24    Manual Differential [[id number]] Collected: 02/13/23 0902    Specimen: Blood Updated: 02/13/23 0926    CBC & Differential [[id number]] Collected: 02/13/23 0902    Specimen: Blood Updated: 02/13/23 0916    Narrative:      The following orders were created for panel order CBC & Differential.  Procedure                               Abnormality         Status                     ---------                               -----------         ------                     CBC Auto Differential[[id number]]                            In process                   Please view results for these tests on the individual orders.    CBC Auto Differential [[id number]] Collected: 02/13/23 0902    Specimen: Blood Updated: 02/13/23 0916    Red Top [[id number]] Collected: 02/13/23 0902    Specimen: Blood Updated: 02/13/23 0916    [place] Draw [[id number]] Collected: 02/13/23 0902    Specimen: Blood Updated: 02/13/23 0916    Narrative:      The following orders were created for panel order [place] Draw.  Procedure                               Abnormality         Status                     ---------                               -----------         ------                     Green Top (Gel)[[id number]]                                                             Lavender Top[[id number]]                                                                Red Top[[id number]]                                          In process                 [place]  Top[[id number]]                                         In process                 Light Blue Top[[id number]]                                                                Please view results for these tests on the individual orders.    [place] Top [[id number]] Collected: 02/13/23 0902    Specimen: Blood Updated: 02/13/23 0916          .  Imaging Results (Last 24 Hours)     Procedure Component Value Units Date/Time    XR Chest 1 View [[id number]] Collected: 02/13/23 0939     Updated: 02/13/23 0943    Narrative:      EXAMINATION: XR CHEST 1 VW- 2/13/2023 9:39 AM CST     HISTORY: Acute Stroke Protocol (Onset < 12 hrs).     REPORT: A frontal view of the chest was obtained.     COMPARISON: There are no correlative imaging studies for comparison.     The lungs are clear, mildly hypoexpanded. There is ectasia of the  thoracic aorta, exaggerated by mild rotation of the patient. Heart size  is normal. No pneumothorax or pleural effusion is identified. The  osseous structures and upper abdomen appear unremarkable.       Impression:      No acute cardiopulmonary abnormality.  This report was finalized on 02/13/2023 09:40 by Dr. Jose E Barreto MD.    CT Head Without Contrast Stroke Protocol [[id number]] Resulted: 02/13/23 0916     Updated: 02/13/23 0916    CT Angiogram Neck [[id number]] Resulted: 02/13/23 0905     Updated: 02/13/23 0905    CT Angiogram Head w AI Analysis of LVO [[id number]] Resulted: 02/13/23 0904     Updated: 02/13/23 0904        Images reviewed as above in the HPI    Lab work reviewed is a leukocytosis of 16.8.  Calcium is slightly low at 8.5.  INR is elevated at 1.24      Impression    • Likely left MCA event.  Possible acute ischemic stroke in left MCA distribution  o Not a tPA candidate secondary to recent hip fracture and repair which is a surgery in a noncompressible site, a direct contraindication to tPA  o No evidence of large vessel occlusion although has poor contrast-enhancement of the [place] of  [place]  • Concern for low ejection fraction  • Leukocytosis  • Hypocalcemia  • Hypertension  • Recent hip surgery and repair    Plan    • Admit to 3A  • Speech therapy evaluation  • Initiate rectal aspirin if does not clear swallow screen  • Cardiac echo  • MRI of brain  • Systolic blood pressure goal of less than 220  • Cardiac telemetry  • Physical, occupational therapy consultations    I discussed the patient's findings and my recommendations with patient and family    45 minutes of critical care time was performed as this was a code stroke with clinical decision making, rapid radiographic interpretation, discussion with ER providers, and discussion with family.    Chapo Baird MD  02/13/23  09:45 CST

## 2023-02-13 NOTE — THERAPY EVALUATION
Acute Care - Speech Language Pathology   Swallow Initial Evaluation [facility]     Patient Name: SARAH Jones  : 1935  MRN: [id number]  Today's Date: 2023               Admit Date: 2023     Patient seen for a bedside swallow evaluation on this date due to new onset of stroke symptoms. Patient wife present. Patient wife reports he called her last night from the rehab facility. The patient displayed no slurred speech or confusion during that time. Earlier today, the facility found the patient unable to communicate and transported for further evaluation. Patient did not indicate any pain. Patient unable to follow commands. Left sided lingual deviation noted. ST presented a small ice chip to elicit a swallow response. No response noted. Patient is not able to participate with oral feeding at this time. NPO is recommended with ST to follow for further swallow evaluation and recommendations. Patient wife expressed understanding. ST communicated results to RN and hospitalist as well. Oral care should be provided every 4 hours with suction. ST will continue to re-assess.     Visit Dx:     ICD-10-CM ICD-9-CM   1. Cerebrovascular accident (CVA), unspecified mechanism (HCC)  I63.9 434.91   2. Altered mental status, unspecified altered mental status type  R41.82 780.97   3. Dysphagia, unspecified type  R13.10 787.20     Patient Active Problem List   Diagnosis   • Cerebrovascular accident (CVA), unspecified mechanism (HCC)     History reviewed. No pertinent past medical history.  No past surgical history on file.    SLP Recommendation and Plan  SLP Swallowing Diagnosis: severe, oral dysphagia, suspected pharyngeal dysphagia (23 1232)  SLP Diet Recommendation: NPO (23 1232)  Recommended Precautions and Strategies: other (see comments) (suctioning oral care every 4 hours) (23 123)  SLP Rec. for Method of Medication Administration: meds via alternate route (23 123)     Monitor for Signs of  Aspiration: yes, notify SLP if any concerns (02/13/23 1232)  Recommended Diagnostics: reassess via clinical swallow evaluation, SLE/Cog/Motor Speech Evaluation (02/13/23 1232)  Swallow Criteria for Skilled Therapeutic Interventions Met: demonstrates skilled criteria (02/13/23 1232)     Rehab Potential/Prognosis, Swallowing: re-evaluate goals as necessary (02/13/23 1232)  Therapy Frequency (Swallow): at least, 3 days per week (02/13/23 1232)  Predicted Duration Therapy Intervention (Days): until discharge (02/13/23 1232)                                        Plan of Care Reviewed With: patient, spouse, caregiver  Progress: no change      SWALLOW EVALUATION (last 72 hours)     SLP Adult Swallow Evaluation     Row Name 02/13/23 1232                   Rehab Evaluation    Document Type evaluation  -MD        Subjective Information no complaints  -MD        Patient Observations alert  -MD        Patient/Family/Caregiver Comments/Observations wife present  -MD        Patient Effort poor  -MD        Comment unable to follow commands or participate with functional tasks  -MD        Symptoms Noted During/After Treatment none  -MD           General Information    Patient Profile Reviewed yes  -MD        Pertinent History Of Current Problem Patient admitted with CVA. Patient history includes recent right hip fracture with surgical repair and HTN.  -MD        Current Method of Nutrition NPO  -MD        Precautions/Limitations, Vision difficult to assess  -MD        Precautions/Limitations, Hearing difficult to assess  -MD        Prior Level of Function-Communication unknown  -MD        Prior Level of Function-Swallowing no diet consistency restrictions  -MD        Plans/Goals Discussed with spouse/S.O.  -MD        Barriers to Rehab medically complex  -MD           Pain    Additional Documentation Pain Scale: FACES Pre/Post-Treatment (Group)  -MD           Pain Scale: FACES Pre/Post-Treatment    Pain: FACES Scale, Pretreatment  0-->no hurt  -MD        Posttreatment Pain Rating 0-->no hurt  -MD           Oral Motor Structure and Function    Dentition Assessment --  unable to fully assess  -MD        Secretion Management problems swallowing secretions  -MD        Mucosal Quality dry  -MD        Volitional Swallow unable to elicit  -MD        Volitional Cough unable to elicit  -MD           Oral Musculature and Cranial Nerve Assessment    Oral Motor General Assessment unable to assess  -MD        Lingual Impairment, Detail. Cranial Nerves IX, XII (Glossopharyngeal and Hypoglossal) reduced lingual ROM;reduced strength left;other (see comments)  noted deviation at rest  -MD           General Eating/Swallowing Observations    Respiratory Support Currently in Use nasal cannula  -MD        Eating/Swallowing Skills fed by SLP  -MD        Positioning During Eating upright in bed  -MD        Utensils Used spoon  -MD        Consistencies Trialed ice chips  -MD        Pre SpO2 (%) 96  -MD        Post SpO2 (%) 97  -MD           Clinical Swallow Eval    Oral Prep Phase impaired  -MD        Oral Transit impaired  -MD        Oral Residue impaired  -MD        Pharyngeal Phase suspected pharyngeal impairment  -MD        Clinical Swallow Evaluation Summary see note  -MD           Oral Prep Concerns    Oral Prep Concerns bolus removed from mouth manually  no manipulation  -MD        Bolus Removed from Mouth Manually other (see comments)  ice chips  -MD           Oral Transit Concerns    Oral Transit Concerns unable to initiate oral transit  -MD           Oral Residue Concerns    Oral Residue Concerns other (see comments)  removed from the mouth  -MD           Pharyngeal Phase Concerns    Pharyngeal Phase Concerns other (see comments)  no swallow elicited  -MD           SLP Evaluation Clinical Impression    SLP Swallowing Diagnosis severe;oral dysphagia;suspected pharyngeal dysphagia  -MD        Functional Impact risk of aspiration/pneumonia;risk of  malnutrition;risk of dehydration  -MD        Rehab Potential/Prognosis, Swallowing re-evaluate goals as necessary  -MD        Swallow Criteria for Skilled Therapeutic Interventions Met demonstrates skilled criteria  -MD           Recommendations    Therapy Frequency (Swallow) at least;3 days per week  -MD        Predicted Duration Therapy Intervention (Days) until discharge  -MD        SLP Diet Recommendation NPO  -MD        Recommended Diagnostics reassess via clinical swallow evaluation;SLE/Cog/Motor Speech Evaluation  -MD        Recommended Precautions and Strategies other (see comments)  suctioning oral care every 4 hours  -MD        SLP Rec. for Method of Medication Administration meds via alternate route  -MD        Monitor for Signs of Aspiration yes;notify SLP if any concerns  -MD           Swallow Goals (SLP)    Swallow LTGs Patient will demonstrate functional swallow for  -MD        Swallow STGs diet tolerance goal selection (SLP)  -MD        Diet Tolerance Goal Selection (SLP) Patient will tolerate trials of  -MD           (LTG) Patient will demonstrate functional swallow for    Diet Texture (Demonstrate functional swallow) mechanical ground textures  -MD        Liquid viscosity (Demonstrate functional swallow) nectar/ mildly thick liquids  -MD        [place] (Demonstrate functional swallow) with minimal cues (75-90% accuracy)  -MD        Time Frame (Demonstrate functional swallow) by discharge  -MD        Barriers (Demonstrate functional swallow) medically complex  -MD        Progress/Outcomes (Demonstrate functional swallow) new goal  -MD           (STG) Patient will tolerate trials of    Consistencies Trialed (Tolerate trials) pureed textures;honey/ moderately thick liquids  -MD        Desired Outcome (Tolerate trials) without signs/symptoms of aspiration;with adequate oral prep/transit/clearance  -MD        [place] (Tolerate trials) with minimal cues (75-90% accuracy)  -MD        Time Frame  (Tolerate trials) by discharge  -MD        Progress/Outcomes (Tolerate trials) new goal  -MD              User Key  (r) = Recorded By, (t) = Taken By, (c) = Cosigned By    Initials Name Effective Dates    Clotilde Duque, SLP 06/21/22 -                 EDUCATION  The patient has been educated in the following areas:   Dysphagia (Swallowing Impairment).        SLP GOALS     Row Name 02/13/23 1232             (LTG) Patient will demonstrate functional swallow for    Diet Texture (Demonstrate functional swallow) mechanical ground textures  -MD      Liquid viscosity (Demonstrate functional swallow) nectar/ mildly thick liquids  -MD      [place] (Demonstrate functional swallow) with minimal cues (75-90% accuracy)  -MD      Time Frame (Demonstrate functional swallow) by discharge  -MD      Barriers (Demonstrate functional swallow) medically complex  -MD      Progress/Outcomes (Demonstrate functional swallow) new goal  -MD         (STG) Patient will tolerate trials of    Consistencies Trialed (Tolerate trials) pureed textures;honey/ moderately thick liquids  -MD      Desired Outcome (Tolerate trials) without signs/symptoms of aspiration;with adequate oral prep/transit/clearance  -MD      [place] (Tolerate trials) with minimal cues (75-90% accuracy)  -MD      Time Frame (Tolerate trials) by discharge  -MD      Progress/Outcomes (Tolerate trials) new goal  -MD            User Key  (r) = Recorded By, (t) = Taken By, (c) = Cosigned By    Initials Name Provider Type    Clotilde Duque, SLP Speech and Language Pathologist                   Time Calculation:    Time Calculation- SLP     Row Name 02/13/23 1334             Time Calculation- SLP    SLP Start Time 1232  -MD      SLP Stop Time 1334  -MD      SLP Time Calculation (min) 62 min  -MD      SLP Received On 02/13/23  -MD      SLP Goal Re-Cert Due Date 02/23/23  -MD         Untimed Charges    24372-IP Eval Oral Pharyng Swallow Minutes 62  -MD         Total  Minutes    Untimed Charges Total Minutes 62  -MD       Total Minutes 62  -MD            User Key  (r) = Recorded By, (t) = Taken By, (c) = Cosigned By    Initials Name Provider Type    Clotilde Duque, SLP Speech and Language Pathologist                Therapy Charges for Today     Code Description Service Date Service Provider Modifiers Qty    [id number]  ST EVAL ORAL PHARYNG SWALLOW 4 2/13/2023 Clotilde Salgado, SLP GN 1               YUNIOR Armas  2/13/2023

## 2023-02-13 NOTE — ED PROVIDER NOTES
Subjective   History of Present Illness  Patient is a 87-year-old gentleman who came to the ED with air EVAC.  The patient lives in assisted living was well at 7 AM in the morning.  At 715 he was noted to have right-sided flaccid paralysis and speech impediment and fixed deviated gaze to the left.  Subsequently air EVAC brought the patient to us.  Do not have much of a history on this patient as for anticoagulation is concerned he takes aspirin there is no history any trauma there is no family members available there is no prior charts available to review.    History provided by:  EMS personnel  History limited by:  Mental status change  Stroke  Presenting symptoms: change in consciousness, confusion, language symptoms and weakness    Onset quality:  Sudden  Last known well:  7 AM  Timing:  Constant  Progression:  Worsening  Similar to previous episodes: no    Associated symptoms: no fall, no bladder incontinence, no seizures and no vomiting        Review of Systems   Unable to perform ROS: Mental status change   Gastrointestinal: Negative for vomiting.   Genitourinary: Negative for bladder incontinence.   Neurological: Positive for weakness. Negative for seizures.   Psychiatric/Behavioral: Positive for confusion.       No past medical history on file.    Allergies   Allergen Reactions   • Penicillins Unknown - Low Severity       No past surgical history on file.    No family history on file.    Social History     Socioeconomic History   • Marital status: Single           Objective   Physical Exam  Vitals and nursing note reviewed. Exam conducted with a chaperone present.   Constitutional:       Appearance: He is toxic-appearing.      Comments: Decreased level of consciousness 60 irrigation to left with right-sided paralysis   HENT:      Head: Normocephalic and atraumatic.   Eyes:      General: Lids are normal.      Conjunctiva/sclera: Conjunctivae normal.      Pupils: Pupils are equal, round, and reactive to light.       Comments: Fixed deviated gaze to the left with sluggish pupils.   Cardiovascular:      Rate and Rhythm: Regular rhythm. Tachycardia present.      Chest Wall: PMI is not displaced.      Pulses: Normal pulses.      Heart sounds: Normal heart sounds.    No systolic murmur is present.   No diastolic murmur is present.  Pulmonary:      Effort: Pulmonary effort is normal. Tachypnea present.      Breath sounds: Examination of the right-lower field reveals decreased breath sounds. Examination of the left-lower field reveals decreased breath sounds. Decreased breath sounds present.   Abdominal:      General: Abdomen is flat.      Palpations: Abdomen is soft.      Tenderness: There is no abdominal tenderness.   Musculoskeletal:         General: Normal range of motion.      Cervical back: Full passive range of motion without pain, normal range of motion and neck supple.      Right lower leg: No edema.      Left lower leg: No edema.   Skin:     General: Skin is warm and dry.      Capillary Refill: Capillary refill takes less than 2 seconds.   Neurological:      General: No focal deficit present.      Mental Status: He is lethargic and confused.      GCS: GCS eye subscore is 4. GCS verbal subscore is 2. GCS motor subscore is 5.      Cranial Nerves: Cranial nerve deficit, dysarthria and facial asymmetry present.      Motor: Weakness, abnormal muscle tone and pronator drift present.      Deep Tendon Reflexes: Reflexes are normal and symmetric.         Procedures           ED Course  ED Course as of 02/13/23 1220   Mon Feb 13, 2023   1216 Patient's NIH score is 23 EKG shows normal sinus rhythm rate 95 bpm.  With some premature atrial complexes.  High sensitive troponin is elevated which could be type II elevation.  He is not complaining of any chest pain but is difficult to certain whether he is having chest pain or not he is tachycardic. [TS]   1217 CT scan shows left MCA infarct.  With poor flow on the contrasted study.   Second to patient's poor cardiac output [TS]      ED Course User Index  [TS] Salas Bradford MD                                           Medical Decision Making  Differential Diagnosis:  I considered toxic-metabolic etiology, hypoglycemia, hyperglycemia, diabetic ketoacidosis, drug overdose, ethanol intoxication, thiamine deficiency, hypothermia, hyponatremia, hypernatremia, organ failure, liver failure, kidney failure, thyroid failure, adrenal failure, hypoxia, hypercarbia, ischemic stroke, intracranial bleed, subarachnoid hemorrhage, closed head injury, subdural hematoma, seizure activity, syncopal episode, infectious etiology, hypertensive encephalopathy, vasculitis, thrombotic thrombocytopenic purpura and disseminated intravascular coagulation as a possible cause of altered mental status in this patient. This is a partial list of diagnoses considered.           Altered mental status, unspecified altered mental status type: acute illness or injury     Details: Patient with altered mental status has got a CVA in the left MCA division.  Cerebrovascular accident (CVA), unspecified mechanism (HCC):     Details: Left MCA infarct.  NIH score 23 not a tPA candidate recent surgery of the hip.  Amount and/or Complexity of Data Reviewed  Labs: ordered.     Details: Troponin I is elevated could be type II elevation versus may be underlying ischemia he is tachycardic but no evidence of any acute MI on the EKG.  Radiology: ordered.     Details: CT scans were reported.  ECG/medicine tests: ordered and independent interpretation performed.     Details: EKG shows sinus rhythm with premature atrial complexes.  Left axis deviation.  Discussion of management or test interpretation with external provider(s): Case were discussed with the hospitalist neurology has seen the patient after code stroke was called.    Risk  Prescription drug management.  Decision regarding hospitalization.    Risk Details: Patient prognosis is guarded.  Is  going be admitted to the medicine service for further evaluation.        Final diagnoses:   Cerebrovascular accident (CVA), unspecified mechanism (HCC)   Altered mental status, unspecified altered mental status type       ED Disposition  ED Disposition     ED Disposition   Decision to Admit    Condition   --    Comment   Level of Care: Telemetry [5]   Diagnosis: Cerebrovascular accident (CVA), unspecified mechanism (HCC) [[id number]]   Admitting Physician: DEVON PEREZ [[id number]]   Attending Physician: DEVON PEREZ [[id number]]   Certification: I Certify That Inpatient Hospital Services Are Medically Necessary For Greater Than 2 Midnights               No follow-up provider specified.       Medication List      No changes were made to your prescriptions during this visit.          Salas Bradford MD  02/13/23 0902       Salas Bradford MD  02/13/23 1220

## 2023-02-13 NOTE — PLAN OF CARE
Goal Outcome Evaluation:  Plan of Care Reviewed With: patient, daughter           Outcome Evaluation: pt admitted to floor from ED. transferred to bed x2 staff. NIH unable to be completed, pt unable to follow commands. pt does open eyes to name. appears to be oriented x1 to person. resistance to care, pushes against bed during changes. purewick in place. dressing to right hip present on admission, c/d/i. open area to bottom, pictures taken and in chart. admission and MRI screening from completed via telephone with patients davian connor and LTC staff. med list reviewed with LTC staff. VSS. 02 2L via NC. bed alarm on. safety maintained.

## 2023-02-14 PROBLEM — I10 ESSENTIAL HYPERTENSION: Status: ACTIVE | Noted: 2023-01-01

## 2023-02-14 PROBLEM — R13.12 OROPHARYNGEAL DYSPHAGIA: Status: ACTIVE | Noted: 2023-01-01

## 2023-02-14 PROBLEM — E87.0 HYPERNATREMIA: Status: ACTIVE | Noted: 2023-01-01

## 2023-02-14 PROBLEM — I50.811 ACUTE RIGHT HEART FAILURE: Status: ACTIVE | Noted: 2023-01-01

## 2023-02-14 PROBLEM — Z96.649 S/P HIP HEMIARTHROPLASTY: Status: ACTIVE | Noted: 2023-01-01

## 2023-02-14 NOTE — PROGRESS NOTES
"    [facility] Medicine Services  INPATIENT PROGRESS NOTE    Patient Name: SARAH Jones  Date of Admission: 2/13/2023  Today's Date: 02/14/23  Length of Stay: 1  Primary Care Physician: Gopi Chino MD    Subjective   Chief Complaint: Follow-up CVA  HPI   He is a resident at the [facility] in [facility] for rehabilitation as he recently underwent right hip bipolar hemiarthroplasty on 2/1/2023 by Dr. Escalera.  He was reportedly doing well and was planned for discharge on Thursday, 2/16.  On 2/13, noted left gaze preference and right hemiparesis with altered mentation.  No anticoagulation postop.  He was taking aspirin 81 mg daily.  Prior to presentation no complaints of chest pain.  He did have several episodes of \"wetting himself\" which is unlike him.     Up in bed with wife, son and daughter at bedside.  Right lower facial droop with right hemiparesis.  Continues to have aphasia.  Daughter states she has heard him say \"go home.\"  SLP reevaluated and recommends to continue n.p.o.  Denies pain.    Review of Systems   All pertinent negatives and positives are as above. All other systems have been reviewed and are negative unless otherwise stated.     Objective    Temp:  [97.3 °F (36.3 °C)-98.4 °F (36.9 °C)] 98.4 °F (36.9 °C)  Heart Rate:  [58-90] 68  Resp:  [16-20] 18  BP: (119-140)/(66-98) 125/66  Physical Exam  Vitals reviewed.   Constitutional:       General: He is not in acute distress.     Appearance: He is not toxic-appearing.      Comments: Up in bed with daughter, son and wife at bedside.  No acute distress.  On 1 L.  Right lower facial droop. Discussed with his nurse Dior.   HENT:      Head: Normocephalic and atraumatic.      Mouth/Throat:      Mouth: Mucous membranes are moist.      Pharynx: Oropharynx is clear.   Eyes:      Extraocular Movements: Extraocular movements intact.      Conjunctiva/sclera: Conjunctivae normal.      Pupils: Pupils are equal, round, and " reactive to light.   Cardiovascular:      Rate and Rhythm: Normal rate and regular rhythm.      Pulses: Normal pulses.   Pulmonary:      Effort: Pulmonary effort is normal. No respiratory distress.      Breath sounds: Normal breath sounds. No wheezing or rhonchi.   Abdominal:      General: Bowel sounds are normal. There is no distension.      Palpations: Abdomen is soft.      Tenderness: There is no abdominal tenderness.   Musculoskeletal:         General: No swelling or tenderness. Normal range of motion.      Cervical back: Normal range of motion and neck supple. No muscular tenderness.   Skin:     General: Skin is warm and dry.      Findings: No erythema or rash.   Neurological:      Mental Status: He is alert.      Motor: Weakness present.      Comments: Aphasia.  Right-sided hemiparesis.   Psychiatric:         Mood and Affect: Mood normal.         Behavior: Behavior normal.       Results Review:  I have reviewed the labs, radiology results, and diagnostic studies.    Laboratory Data:   Results from last 7 days   Lab Units 02/14/23  0507 02/13/23  0902   WBC 10*3/mm3 12.78* 16.86*   HEMOGLOBIN g/dL 13.6 14.8   HEMATOCRIT % 40.9 44.9   PLATELETS 10*3/mm3 123* 163        Results from last 7 days   Lab Units 02/14/23  0507 02/13/23  0902   SODIUM mmol/L 149* 145   POTASSIUM mmol/L 3.5 3.5   CHLORIDE mmol/L 114* 107   CO2 mmol/L 21.0* 23.0   BUN mg/dL 31* 32*   CREATININE mg/dL 1.00 1.06   CALCIUM mg/dL 8.0* 8.5*   BILIRUBIN mg/dL 2.1* 2.5*   ALK PHOS U/L 79 95   ALT (SGPT) U/L 5 5   AST (SGOT) U/L 20 20   GLUCOSE mg/dL 136* 143*       Culture Data:   Microbiology Results (last 10 days)     ** No results found for the last 240 hours. **        Radiology Data:   Imaging Results (Last 24 Hours)     Procedure Component Value Units Date/Time    MRI Brain Without Contrast [[id number]] Collected: 02/13/23 1828     Updated: 02/13/23 1834    Narrative:      EXAMINATION: MRI BRAIN WO CONTRAST-     2/13/2023 5:38 PM CST      HISTORY: Neuro deficit, acute, stroke suspected; I63.9-Cerebral  infarction, unspecified; R41.82-Altered mental status, unspecified;  R13.10-Dysphagia, unspecified.     Noncontrast MR imaging of the brain.  Axial and sagittal sequences.     Large acute left MCA infarct involving an area measuring approximately 9  x 3 cm.     Small cortical infarct in the right frontal lobe involving an area  measuring approximately 2 x 1 cm.     No hemorrhage or mass effect.  No midline shift.     Ventricle size is normal.     The visualized paranasal sinuses are clear.     There is prominent diffuse atrophy and mild small vessel disease.     Summary:  1. Large left parieto-occipital and small right frontal acute infarct.  2. No hemorrhage or midline shift.              This report was finalized on 02/13/2023 18:31 by Dr. Pradeep Parra MD.          I have reviewed the patient's current medications.     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 Jones is an 87-year-old male who [facility] with left gaze preference and right hemiparesis with altered mentation.  He is a resident at the [facility] in [facility] for rehabilitation as he recently underwent right hip bipolar hemiarthroplasty on 2/1/2023 by Dr. Escalera.  No anticoagulation postop.  He was taking aspirin 81 mg daily.  He was reportedly doing well and was planned for discharge on Thursday, 2/16.  He arrived to our medical facility as a possible code stroke. A stat head CT without contrast showed hypoattenuation in the right frontal lobe.  The left MCA region may have some hypoattenuation in the gray-white matter border.  Not a candidate for tPA as he had a recent major hip surgery.  Chest x-ray  stable.    Dr. Baird with neurology evaluating patient.  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  No evidence of large vessel occlusion on CTA head neck.  Continue rectal aspirin.  LDL 49, high intensity statin when able to tolerate p.o.    Elevated high-sensitivity troponin 69 with follow-up 74.  Troponin T delta 5.  No chest pain.  Abnormal EKG.  Results for orders placed during the hospital encounter of 02/13/23    Adult Transthoracic Echo Complete W/ Cont if Necessary Per Protocol (With Agitated Saline)    Interpretation Summary  •  Left ventricular systolic function is normal. Left ventricular ejection fraction appears to be 66 - 70%.  •  The left ventricular cavity is small in size.  •  Severely reduced right ventricular systolic function noted.  •  The right ventricular cavity is severely dilated.  •  Saline test results are negative.  •  The right atrial cavity is severely  dilated.  •  Estimated right ventricular systolic pressure from tricuspid regurgitation is normal (<35 mmHg).  Discussed with Dr. Moreno, consult cardiology.    History of hypertension.  Blood pressure at goal.  Takes Norvasc and Toprol-XL as outpatient.    Hypernatremia 149.  NPO.  Change IV fluid composition of D5.  BMP in AM.    PT/OT/SLP.  SLP reevaluated recommends to remain NPO.    Medical Decision Making  Number and Complexity of problems: 3 complex problems in the form of large left parietal occipital and right frontal infarct, hypernatremia, abnormal EKG  Differential Diagnosis: None at present    Conditions and Status        Condition is unchanged.     [facility] Data  External documents reviewed: Discharge summary from [facility] [place], operated by [facility]  Cardiac tracing (EKG, telemetry) interpretation: abormal ekg  Radiology interpretation: Interpreted by radiology  Labs reviewed: Yes as above  Any tests that were considered but not ordered: None at present     Decision rules/scores evaluated  (example [id number], [place], etc): None at present     Discussed with: Patient and Dr. Moreno     Care Planning  Shared decision making: Patient's wife is agreeable for work-up and treatment  Code status and discussions: No CPR with limited support to include no intubation/cardioversion    Disposition  Social Determinants of Health that impact treatment or disposition: Return to rehab.  Referral to the [facility], will need insurance pre-CERT.  I expect the patient to be discharged to rehab once medically stable.  Currently NPO.    Electronically signed by BETO Goldstein, 02/14/23, 13:32 CST.

## 2023-02-14 NOTE — PROGRESS NOTES
Neurology Progress Note      Chief Complaint:    Large left parietal occipital infarct  Small right frontal infarct  Right heart cardiomyopathy  Elevated troponin    Subjective     Subjective:  Patient had no new complaints or events overnight.  Continues to demonstrate right neglect and right hemiparesis    Troponin is elevated.    2D echocardiogram report as follows:    Hemoglobin A1c 5.40%  LDL 49      Past Medical History:   Diagnosis Date   • Hypertension      Past Surgical History:   Procedure Laterality Date   • ORIF FEMUR FRACTURE       Family History   Problem Relation Age of Onset   • No Known Problems Mother    • No Known Problems Father      Social History     Tobacco Use   • Smoking status: Never     Passive exposure: Never   • Smokeless tobacco: Never   Substance Use Topics   • Alcohol use: Never   • Drug use: Never       Medications:  Current Facility-Administered Medications   Medication Dose Route Frequency Provider Last Rate Last Admin   • aspirin tablet 325 mg  325 mg Oral Daily Coni Moreno MD        Or   • aspirin suppository 300 mg  300 mg Rectal Daily Coni Moreno MD   300 mg at 02/13/23 1806   • atorvastatin (LIPITOR) tablet 80 mg  80 mg Oral Nightly Coni Moreno MD       • dextrose 5 % and sodium chloride 0.45 % with KCl 20 mEq/L infusion  100 mL/hr Intravenous Continuous Coni Moreno MD       • ondansetron (ZOFRAN) injection 4 mg  4 mg Intravenous Q6H PRN Coni Moreno MD       • sodium chloride 0.9 % flush 10 mL  10 mL Intravenous PRN Coni Moreno MD       • sodium chloride 0.9 % flush 10 mL  10 mL Intravenous Q12H Coni Moreno MD   10 mL at 02/13/23 2010   • sodium chloride 0.9 % flush 10 mL  10 mL Intravenous PRN Coni Moreno MD       • sodium chloride 0.9 % infusion 40 mL  40 mL Intravenous PRN Coni Moreno MD           Allergies:    Penicillins      Objective      Vital Signs  Temp:  [97.3 °F (36.3 °C)-97.8 °F (36.6 °C)]  97.3 °F (36.3 °C)  Heart Rate:  [58-98] 58  Resp:  [14-20] 18  BP: ()/(66-98) 135/66    Physical Exam:    General Exam:  Head:  Normocephalic, atraumatic  HEENT:  Neck supple  Fundoscopic Exam:  No signs of disc edema  CVS:  Regular rate and rhythm.  No murmurs  Carotid Examination:  No bruits  Lungs:  Clear to auscultation  Abdomen:  Nontender, Nondistended  Extremities:  No signs of peripheral edema  Skin:  No rashes     Neurologic Exam:  Awake.  Alert.   Mixed receptive and expressive aphasia.   Occasional command following.  Decrease blink to threat from right.  Left gaze preference.   Pupils equal.     Corneal & gag reflex is intact.    Right lower facial droop.    Does not protrude tongue on command.     Left arm and leg have full strength.  3 out of 5 strength on right arm and leg with an everted foot suggestive of weakness in the right lower extremity  Upgoing toe on right  Sensory examination reveals withdrawal from the right arm and leg to noxious stimulation  No tremor        Results Review:    I reviewed the patient's new clinical results and findings.    Lab Results (last 24 hours)     Procedure Component Value Units Date/Time    POC Glucose Once [[id number]]  (Normal) Collected: 02/14/23 0844    Specimen: Blood Updated: 02/14/23 0856     Glucose 112 mg/dL      Comment: : [id number] Cornell Cannon ID: [id number]       Hemoglobin A1c [[id number]]  (Normal) Collected: 02/14/23 0507    Specimen: Blood Updated: 02/14/23 0554     Hemoglobin A1C 5.40 %     Narrative:      Hemoglobin A1C Ranges:    Increased Risk for Diabetes  5.7% to 6.4%  Diabetes                     >= 6.5%  Diabetic Goal                < 7.0%    Lipid Panel [[id number]]  (Abnormal) Collected: 02/14/23 0507    Specimen: Blood Updated: 02/14/23 0548     Total Cholesterol 95 mg/dL      Triglycerides 111 mg/dL      HDL Cholesterol 25 mg/dL      LDL Cholesterol  49 mg/dL      VLDL Cholesterol 21 mg/dL      LDL/HDL Ratio 1.91     Narrative:      Cholesterol Reference Ranges  (U.S. Department of Health and Human Services ATP III Classifications)    Desirable          <200 mg/dL  Borderline High    200-239 mg/dL  High Risk          >240 mg/dL      Triglyceride Reference Ranges  (U.S. Department of Health and Human Services ATP III Classifications)    Normal           <150 mg/dL  Borderline High  150-199 mg/dL  High             200-499 mg/dL  Very High        >500 mg/dL    HDL Reference Ranges  (U.S. Department of Health and Human Services ATP III Classifications)    Low     <40 mg/dl (major risk factor for CHD)  High    >60 mg/dl ('negative' risk factor for CHD)        LDL Reference Ranges  (U.S. Department of Health and Human Services ATP III Classifications)    Optimal          <100 mg/dL  Near Optimal     100-129 mg/dL  Borderline High  130-159 mg/dL  High             160-189 mg/dL  Very High        >189 mg/dL    Comprehensive Metabolic Panel [[id number]]  (Abnormal) Collected: 02/14/23 0507    Specimen: Blood Updated: 02/14/23 0546     Glucose 136 mg/dL      BUN 31 mg/dL      Creatinine 1.00 mg/dL      Sodium 149 mmol/L      Potassium 3.5 mmol/L      Comment: Slight hemolysis detected by analyzer. Results may be affected.        Chloride 114 mmol/L      CO2 21.0 mmol/L      Calcium 8.0 mg/dL      Total Protein 5.4 g/dL      Albumin 3.3 g/dL      ALT (SGPT) 5 U/L      AST (SGOT) 20 U/L      Alkaline Phosphatase 79 U/L      Total Bilirubin 2.1 mg/dL      Globulin 2.1 gm/dL      A/G Ratio 1.6 g/dL      BUN/Creatinine Ratio 31.0     Anion Gap 14.0 mmol/L      eGFR 73.8 mL/min/1.73     Narrative:      GFR Normal >60  Chronic Kidney Disease <60  Kidney Failure <15    The GFR formula is only valid for adults with stable renal function between ages 18 and 70.    CBC (No Diff) [[id number]]  (Abnormal) Collected: 02/14/23 0507    Specimen: Blood Updated: 02/14/23 0532     WBC 12.78 10*3/mm3      RBC 3.78 10*6/mm3      Hemoglobin 13.6 g/dL       Hematocrit 40.9 %      .2 fL      MCH 36.0 pg      MCHC 33.3 g/dL      RDW 14.1 %      RDW-SD 54.9 fl      MPV 10.5 fL      Platelets 123 10*3/mm3     POC Glucose Once [[id number]]  (Abnormal) Collected: 02/14/23 0425    Specimen: Blood Updated: 02/14/23 0436     Glucose 133 mg/dL      Comment: : [id number] Flavio JamesMeter ID: [id number]       POC Glucose Once [[id number]]  (Normal) Collected: 02/13/23 1822    Specimen: Blood Updated: 02/13/23 1832     Glucose 120 mg/dL      Comment: : [email or web address] Bright MeganMeter ID: [id number]       [place] Draw [[id number]] Collected: 02/13/23 0902    Specimen: Blood Updated: 02/13/23 1315    Narrative:      The following orders were created for panel order [place] Draw.  Procedure                               Abnormality         Status                     ---------                               -----------         ------                     Green Top (Gel)[[id number]]                                  Final result               Lavender Top[[id number]]                                                                Red Top[[id number]]                                          Final result               Gray Top[[id number]]                                         Final result               Light Blue Top[[id number]]                                                                Please view results for these tests on the individual orders.    Grant Top [[id number]] Collected: 02/13/23 0902    Specimen: Blood Updated: 02/13/23 1315     Extra Tube Hold for add-ons.     Comment: Auto resulted.       High Sensitivity Troponin T 2Hr [[id number]]  (Abnormal) Collected: 02/13/23 1106    Specimen: Blood Updated: 02/13/23 1138     HS Troponin T 74 ng/L      Troponin T Delta 5 ng/L     Narrative:      High Sensitive Troponin T Reference Range:  <10.0 ng/L- Negative Female for AMI  <15.0 ng/L- Negative Male for AMI  >=10 - Abnormal Female indicating possible myocardial injury.  >=15 -  Abnormal Male indicating possible myocardial injury.   Clinicians would have to utilize clinical acumen, EKG, Troponin, and serial changes to determine if it is an Acute Myocardial Infarction or myocardial injury due to an underlying chronic condition.         High Sensitivity Troponin T [[id number]]  (Abnormal) Collected: 02/13/23 0902    Specimen: Blood Updated: 02/13/23 1050     HS Troponin T 69 ng/L     Narrative:      High Sensitive Troponin T Reference Range:  <10.0 ng/L- Negative Female for AMI  <15.0 ng/L- Negative Male for AMI  >=10 - Abnormal Female indicating possible myocardial injury.  >=15 - Abnormal Male indicating possible myocardial injury.   Clinicians would have to utilize clinical acumen, EKG, Troponin, and serial changes to determine if it is an Acute Myocardial Infarction or myocardial injury due to an underlying chronic condition.         Green Top (Gel) [[id number]] Collected: 02/13/23 0902    Specimen: Blood Updated: 02/13/23 1045     Extra Tube Hold for add-ons.     Comment: Auto resulted.       POC Glucose Once [[id number]]  (Normal) Collected: 02/13/23 1018    Specimen: Blood Updated: 02/13/23 1031     Glucose 127 mg/dL      Comment: : rgramlis Gramlisch RobertMeter ID: [id number]       Red Top [[id number]] Collected: 02/13/23 0902    Specimen: Blood Updated: 02/13/23 1016     Extra Tube Hold for add-ons.     Comment: Auto resulted.       CBC & Differential [[id number]]  (Abnormal) Collected: 02/13/23 0902    Specimen: Blood Updated: 02/13/23 0950    Narrative:      The following orders were created for panel order CBC & Differential.  Procedure                               Abnormality         Status                     ---------                               -----------         ------                     CBC Auto Differential[[id number]]        Abnormal            Final result                 Please view results for these tests on the individual orders.    CBC Auto Differential  [[id number]]  (Abnormal) Collected: 02/13/23 0902    Specimen: Blood Updated: 02/13/23 0950     WBC 16.86 10*3/mm3      RBC 4.14 10*6/mm3      Hemoglobin 14.8 g/dL      Hematocrit 44.9 %      .5 fL      MCH 35.7 pg      MCHC 33.0 g/dL      RDW 14.2 %      RDW-SD 55.8 fl      MPV 10.2 fL      Platelets 163 10*3/mm3     Manual Differential [[id number]]  (Abnormal) Collected: 02/13/23 0902    Specimen: Blood Updated: 02/13/23 0950     Neutrophil % 85.1 %      Lymphocyte % 0.0 %      Monocyte % 9.9 %      Eosinophil % 1.0 %      Bands %  1.0 %      Metamyelocyte % 2.0 %      Myelocyte % 1.0 %      Neutrophils Absolute 14.52 10*3/mm3      Lymphocytes Absolute 0.00 10*3/mm3      Monocytes Absolute 1.67 10*3/mm3      Eosinophils Absolute 0.17 10*3/mm3      Anisocytosis Slight/1+     Crenated RBC's Slight/1+     Macrocytes Slight/1+     WBC Morphology Normal     Platelet Morphology Normal    Comprehensive Metabolic Panel [[id number]]  (Abnormal) Collected: 02/13/23 0902    Specimen: Blood Updated: 02/13/23 0939     Glucose 143 mg/dL      BUN 32 mg/dL      Creatinine 1.06 mg/dL      Sodium 145 mmol/L      Potassium 3.5 mmol/L      Comment: Slight hemolysis detected by analyzer. Results may be affected.        Chloride 107 mmol/L      CO2 23.0 mmol/L      Calcium 8.5 mg/dL      Total Protein 6.3 g/dL      Albumin 3.6 g/dL      ALT (SGPT) 5 U/L      AST (SGOT) 20 U/L      Alkaline Phosphatase 95 U/L      Total Bilirubin 2.5 mg/dL      Globulin 2.7 gm/dL      A/G Ratio 1.3 g/dL      BUN/Creatinine Ratio 30.2     Anion Gap 15.0 mmol/L      eGFR 68.8 mL/min/1.73     Narrative:      GFR Normal >60  Chronic Kidney Disease <60  Kidney Failure <15    The GFR formula is only valid for adults with stable renal function between ages 18 and 70.    Protime-INR [[id number]]  (Abnormal) Collected: 02/13/23 0902    Specimen: Blood Updated: 02/13/23 0928     Protime 15.7 Seconds      INR 1.24          Imaging Results (Last 24 Hours)      Procedure Component Value Units Date/Time    MRI Brain Without Contrast [[id number]] Collected: 02/13/23 1828     Updated: 02/13/23 1834    Narrative:      EXAMINATION: MRI BRAIN WO CONTRAST-     2/13/2023 5:38 PM CST     HISTORY: Neuro deficit, acute, stroke suspected; I63.9-Cerebral  infarction, unspecified; R41.82-Altered mental status, unspecified;  R13.10-Dysphagia, unspecified.     Noncontrast MR imaging of the brain.  Axial and sagittal sequences.     Large acute left MCA infarct involving an area measuring approximately 9  x 3 cm.     Small cortical infarct in the right frontal lobe involving an area  measuring approximately 2 x 1 cm.     No hemorrhage or mass effect.  No midline shift.     Ventricle size is normal.     The visualized paranasal sinuses are clear.     There is prominent diffuse atrophy and mild small vessel disease.     Summary:  1. Large left parieto-occipital and small right frontal acute infarct.  2. No hemorrhage or midline shift.              This report was finalized on 02/13/2023 18:31 by Dr. Pradeep Parra MD.    CT Angiogram Head w AI Analysis of LVO [[id number]] Collected: 02/13/23 1009     Updated: 02/13/23 1017    Narrative:      CT ANGIOGRAM HEAD W AI ANALYSIS OF LVO- 2/13/2023 9:04 AM CST     HISTORY: Neuro deficit, acute stroke suspected, right-sided weakness  with speech impediment     COMPARISON: Nonenhanced CT head exam 02/13/2023     DOSE LENGTH PRODUCT: 161 mGy cm. Automated exposure control was also  utilized to decrease patient radiation dose.     TECHNIQUE: Axial images of the brain are performed following IV  contrast. 2-D and maximal intensity projectional reconstructed images  are reviewed. Poor contrast bolus related to poor cardiac output. Mild  motion artifact. AI analysis of LVO was utilized.        FINDINGS:  Mild contrast enhancement of the distal internal carotid  arteries with mild enhancement of the anterior and middle cerebral  arteries. A discrete central  thrombus is not localized within these  vessels. There is minimal if any contrast seen within the posterior  cerebral and small caliber basilar artery resulting in diminished  assessment. The RAPID analysis suggests decreased enhancement of the  left MCA branches.       Impression:      1. Suboptimal contrast enhancement related to poor cardiac output and a  summation artifact. Findings concerning for decreased flow to the  branches of the left MCA artery. Please refer to dictation above.  This report was finalized on 02/13/2023 10:14 by Dr. Alanna Rivera MD.    CT Angiogram Neck [[id number]] Collected: 02/13/23 1002     Updated: 02/13/23 1012    Narrative:      CT ANGIOGRAM NECK- 2/13/2023 9:04 AM CST     HISTORY: Neuro deficit, acute stroke suspected, right paralysis with  speech impediment     COMPARISON: None     DOSE LENGTH PRODUCT: 160 mGy cm. Automated exposure control was also  utilized to decrease patient radiation dose.     TECHNIQUE: Axial images of the neck are performed following IV contrast.  2-D and maximal intensity projectional reconstructed images are  reviewed.     FINDINGS:  Contrast is present within the left subclavian,  brachiocephalic veins and SVC with mild contrast suspected within the  arch of the thoracic aorta. Findings likely represent sequelae of poor  cardiac output. Patient with persistent motion. Image quality degraded  for these reasons.     There is moderate calcification within the arch of the thoracic aorta.  No significant contrast present within the common, internal, and  external carotid arteries to assess severity of carotid artery stenosis.  There is moderate calcified plaque of the left carotid bulb extending  into the proximal left internal carotid artery which results in at least  50% stenosis of the carotid bulb. Only mild calcified plaque of the  right carotid bulb. Hypoplasia suspected of the left vertebral artery.  No significant vertebral artery contrast  enhancement during the exam. No  convincing aneurysm localized.     No soft tissue mass or pathologic lymphadenopathy identified within the  neck.     Calcified granuloma the left lung apex. Moderate to advanced  degenerative change of the cervical spine.       Impression:      1. Imaging is degraded by the patient's poor cardiac output and timing  of contrast bolus as well as repetitive patient motion artifact. Poor  enhancement of the extracranial carotid and vertebral arteries during  image acquisition. Moderate calcified plaque of the left carotid bulb  resulting in at least 50% stenosis of the bulb. Only mild calcified  plaque of the right carotid bulb. Moderate atherosclerosis of the normal  caliber thoracic aorta.  This report was finalized on 02/13/2023 10:09 by Dr. Alanna Rivera MD.    CT Head Without Contrast Stroke Protocol [[id number]] Collected: 02/13/23 0951     Updated: 02/13/23 1005    Narrative:      CT HEAD WO CONTRAST STROKE PROTOCOL- 2/13/2023 9:16 AM CST     HISTORY: Neuro deficit, acute, stroke suspected, right sided paralysis  and speech impediment     COMPARISON: None     DOSE LENGTH PRODUCT: 793 mGy cm. Automated exposure control was also  utilized to decrease patient radiation dose.     TECHNIQUE: Axial images the brain are obtained without contrast     FINDINGS:  There is questionable hyperdense left M2/3 cerebral artery  which may be seen with left MCA ischemia. Questionable loss of the  gray-white matter differentiation along the left insular cortex. There  is no intracranial hemorrhage. Old ischemic changes of the right frontal  as well as right parieto-occipital region. There are chronic small  vessel ischemic changes. No extra-axial hematoma. No mass effect at this  time. Mild generalized volume loss.     Visible paranasal sinuses and mastoid air cells are well-aerated.       Impression:      1. Findings concerning for early left MCA ischemia with a hyperdense  left MCA sign and loss  of gray-white matter differentiation along the  left insular cortex. No intracranial hemorrhage or prominent mass  effect.  2. Old ischemic changes right cerebral hemisphere with chronic small  vessel ischemia.     Comments: Findings called to Dr. Bradford in the ER at 10:00 AM on  02/13/2023  This report was finalized on 02/13/2023 10:02 by Dr. Alanna Rivera MD.    XR Chest 1 View [[id number]] Collected: 02/13/23 0939     Updated: 02/13/23 0943    Narrative:      EXAMINATION: XR CHEST 1 VW- 2/13/2023 9:39 AM CST     HISTORY: Acute Stroke Protocol (Onset < 12 hrs).     REPORT: A frontal view of the chest was obtained.     COMPARISON: There are no correlative imaging studies for comparison.     The lungs are clear, mildly hypoexpanded. There is ectasia of the  thoracic aorta, exaggerated by mild rotation of the patient. Heart size  is normal. No pneumothorax or pleural effusion is identified. The  osseous structures and upper abdomen appear unremarkable.       Impression:      No acute cardiopulmonary abnormality.  This report was finalized on 02/13/2023 09:40 by Dr. Jose E Barreto MD.              Assessment/Plan     Hospital Problem List      Cerebrovascular accident (CVA), unspecified mechanism (HCC)      Impression    • Large, left parietal occipital and right frontal infarct  ? Not a tPA candidate secondary to recent hip fracture aLikely left MCA event. Acute ischemic stroke in left MCA distribution which is a surgery in a noncompressible site, a direct contraindication to tPA  ? No evidence of large vessel occlusion although has poor contrast-enhancement of the [place]  • Low right ventricular systolic function/right heart cardiomyopathy  • Elevated troponin  • Oropharyngeal dysphagia  • Leukocytosis  • Hypocalcemia  • Hypertension  • Recent hip surgery and repair    Plan    • Unclear as to whether or not elevated troponins and right ventricular dysfunction represent a right heart infarct.  EKG  demonstrated anterior and inferior lateral infarct changes.  Will defer to hospitalist regarding further evaluation.  • Patient remains n.p.o. at this time.  Small ice chip did not stimulate a swallow response.  May need to consider tube replacement of NG tube for nutrition and medication.  Will defer to speech therapy as a follow-up.  Suspect that he may have a delayed improvement in his dysphagia.  • Blood pressure 135/66.  Continue with goals of less than 140/90 at this time.  • Continue rectal aspirin.  • Statin therapy once able to receive medication orally or by NG tube.  • Work on maintaining you natremia with sodium of 135-145.  (Sodium was 149 today).  • Continue PT, OT & ST.  • We will need to go back to skilled nursing placement upon discharge.          Rizwan Mas PA-C  02/14/23  09:23 CST    Medical Decision Making     Number/Complexity of Problems  1. Moderate  ? 1 undiagnosed new problem with uncertain prognosis -   ? 1 acute illness with systemic symptoms -   2. High  ? 1 acute or chronic illness that pose a threat to life/body function -   Level of medical decision making is high in the setting of large left parietal occipital and right frontal infarcts with dense right hemiparesis and severe mixed aphasia, elevated troponin, possible right heart failure/?  Right heart infarct that could result in threat to life or disability.     MDM Data  • Moderate - 1/3 categories  • Extensive - 2/3 categories     1. Category 1: 3 of the following  ? Review of external notes  ? Review of results  ? Ordering of each unique test  ? Independent historian  2. Category 2:  Independent interpretation of test (ex: imaging)  3. Category 3:  Discussion of management with another provider  I have reviewed all the neuroimaging from yesterday including CT, CTA head and neck and MRI brain.  I reviewed these clinical findings with patient at bedside.  Reviewed findings with Dr. Baird and nursing.     Treatment  Plan  1. Moderate - Prescription Drug management  2. High  ? Drug therapy requiring intensive monitoring for toxicity  ? Decision regarding hospitalization or escalation of care  ? De-escalate care/DNR decisions  Continuation of rectal aspirin and decision making regarding possible NG tube placement for nutrition and medication administration.

## 2023-02-14 NOTE — PLAN OF CARE
Goal Outcome Evaluation:  Plan of Care Reviewed With: patient        Progress: no change  Outcome Evaluation: The patient presents with eyes open with gaze midline to the L. He demonstrates minimal movement of the R UE and R LE. He demonstrates full spontaneous movement of his L UE and L LE but not to command. He was able to roll in bed with assist and maintain position to be cleaned. He seemed to attempt to answer questions, but all answers where jargon. He did purposefully reach for his controller for the TV. PT will continue to work with him to encouarge direction following and increase mobility as tolerated. Recommend discharge to SNF.

## 2023-02-14 NOTE — PLAN OF CARE
Goal Outcome Evaluation:  Plan of Care Reviewed With: patient, other (see comments) (RN and PCA)        Progress: no change  Outcome Evaluation: OT evaluation completed. Pt alerts to name. Pt makes multiple attempts to verbally communicate with expressive aphasia noted. Pt at times nods appropriate. Pt attempts to follow commands, but noted resistance at times. LUE WFL AROM, minimal spontaneous AROM to RUE. Pt is resistive to ROM to R shoulder at less than 10% shoulder flexion does not grimmace just shakes his head no when asked to raise R shoulder. LUE functional 5/5 strength, RUE 2-/5 overall. R shoulder difficult to assess. Pt largely keeps gaze/attention to L side but does come to atleast midline at times. Pt was max A for rolling R and L with glide pad. Pt was dependent for toilet hygiene, UB dressing and LB dressing tasks. Skilled OT recommended to address adls, functional mobility and endurance. Recommended d/c SNF.

## 2023-02-14 NOTE — THERAPY EVALUATION
Patient Name: SARAH Jones  : 1935    MRN: [id number]                              Today's Date: 2023       Admit Date: 2023    Visit Dx:     ICD-10-CM ICD-9-CM   1. Cerebrovascular accident (CVA), unspecified mechanism (HCC)  I63.9 434.91   2. Altered mental status, unspecified altered mental status type  R41.82 780.97   3. Dysphagia, unspecified type  R13.10 787.20   4. Impaired mobility  Z74.09 799.89     Patient Active Problem List   Diagnosis   • Cerebrovascular accident (CVA), unspecified mechanism (HCC)   • S/P hip hemiarthroplasty on 2023   • Essential hypertension   • Oropharyngeal dysphagia   • Hypernatremia   • Acute right heart failure (HCC)     Past Medical History:   Diagnosis Date   • Hypertension      Past Surgical History:   Procedure Laterality Date   • ORIF FEMUR FRACTURE        General Information     Row Name 23 1130          Physical Therapy Time and Intention    Document Type evaluation  large L parieto-occipital and small R frontal acute infarct, recent R hip fx with posterior THR  -MS     Mode of Treatment physical therapy;co-treatment  -MS     Row Name 23 1130          General Information    Patient Profile Reviewed yes  -MS     Prior Level of Function --  pt was living at home as of 2 weeks ago prior to hip fx. He was walking short distances after hip surgery  -MS     Existing Precautions/Restrictions left;hip, posterior;fall  -MS     Barriers to Rehab medically complex;previous functional deficit;cognitive status;language barrier;physical barrier  -MS     Row Name 23 1130          Living Environment    People in Home facility resident  -MS     Row Name 23 1130          Cognition    Orientation Status (Cognition) unable/difficult to assess  -MS     Row Name 23 1130          Safety Issues, Functional Mobility    Impairments Affecting Function (Mobility) cognition;strength;visual/perceptual;sensation/sensory awareness;motor control;range of  motion (ROM)  -MS           User Key  (r) = Recorded By, (t) = Taken By, (c) = Cosigned By    Initials Name Provider Type    Sonya Jin UMU, PT, DPT, NCS Physical Therapist               Mobility     Row Name 02/14/23 1130          Bed Mobility    Bed Mobility rolling left;rolling right  -MS     Rolling Left [place] (Bed Mobility) maximum assist (25% patient effort);verbal cues;nonverbal cues (demo/gesture)  -MS     Rolling Right [place] (Bed Mobility) maximum assist (25% patient effort);verbal cues;nonverbal cues (demo/gesture)  -MS     Assistive Device (Bed Mobility) draw sheet;bed rails  -MS           User Key  (r) = Recorded By, (t) = Taken By, (c) = Cosigned By    Initials Name Provider Type    Sonya Jin UMU, PT, DPT, NCS Physical Therapist               Obj/Interventions     Row Name 02/14/23 1130          Range of Motion Comprehensive    Comment, General Range of Motion pt demonstrated minimal spontaneous active movement of the R UE and R LE, full movement of the L UE and LE  -MS     Row Name 02/14/23 1130          Strength Comprehensive (MMT)    Comment, General Manual Muscle Testing (MMT) Assessment pt resists movement with L UE and LE strong 5/5, R UE and R LE minimal movement and minimal resistance 2-/5  -MS     Row Name 02/14/23 1130          Sensory Assessment (Somatosensory)    Sensory Assessment (Somatosensory) unable/difficult to assess  -MS           User Key  (r) = Recorded By, (t) = Taken By, (c) = Cosigned By    Initials Name Provider Type    MS Huston Sonya UMU, PT, DPT, NCS Physical Therapist               Goals/Plan     Row Name 02/14/23 1130          Bed Mobility Goal 1 (PT)    Activity/Assistive Device (Bed Mobility Goal 1, PT) bed mobility activities, all  -MS     [place] Level/Cues Needed (Bed Mobility Goal 1, PT) moderate assist (50-74% patient effort);verbal cues required;tactile cues required  -MS     Time Frame (Bed Mobility Goal 1, PT) long term goal (LTG);by  discharge  -MS     Progress/Outcomes (Bed Mobility Goal 1, PT) new goal  -MS     Row Name 02/14/23 1130          Transfer Goal 1 (PT)    Activity/Assistive Device (Transfer Goal 1, PT) sit-to-stand/stand-to-sit;bed-to-chair/chair-to-bed  -MS     [place] Level/Cues Needed (Transfer Goal 1, PT) moderate assist (50-74% patient effort);verbal cues required;tactile cues required  -MS     Time Frame (Transfer Goal 1, PT) long term goal (LTG);by discharge  -MS     Progress/Outcome (Transfer Goal 1, PT) new goal  -MS     Row Name 02/14/23 1130          ROM Goal 1 (PT)    ROM Goal 1 (PT) pt will demonstrates full active ROM of all extremities  -MS     Time Frame (ROM Goal 1, PT) long-term goal (LTG);by discharge  -MS     Progress/Outcome (ROM Goal 1, PT) new goal  -MS     Row Name 02/14/23 1130          Therapy Assessment/Plan (PT)    Planned Therapy Interventions (PT) balance training;bed mobility training;patient/family education;strengthening;ROM (range of motion);transfer training  -MS           User Key  (r) = Recorded By, (t) = Taken By, (c) = Cosigned By    Initials Name Provider Type    MS Sonya Huston, PT, DPT, NCS Physical Therapist               Clinical Impression     Row Name 02/14/23 1130          Pain Scale: FACES Pre/Post-Treatment    Pain: FACES Scale, Pretreatment 0-->no hurt  -MS     Posttreatment Pain Rating 0-->no hurt  -MS     Row Name 02/14/23 1130          Plan of Care Review    Plan of Care Reviewed With patient  -MS     Progress no change  -MS     Outcome Evaluation The patient presents with eyes open with gaze midline to the L. He demonstrates minimal movement of the R UE and R LE. He demonstrates full spontaneous movement of his L UE and L LE but not to command. He was able to roll in bed with assist and maintain position to be cleaned. He seemed to attempt to answer questions, but all answers where jargon. He did purposefully reach for his controller for the TV. PT will continue to work  with him to encouarge direction following and increase mobility as tolerated. Recommend discharge to SNF.  -MS     Row Name 02/14/23 1130          Therapy Assessment/Plan (PT)    Patient/Family Therapy Goals Statement (PT) pt unable to state  -MS     Rehab Potential (PT) good, to achieve stated therapy goals  -MS     Criteria for Skilled Interventions Met (PT) yes;meets criteria;skilled treatment is necessary  -MS     Therapy Frequency (PT) 2 times/day  -MS     Predicted Duration of Therapy Intervention (PT) until discharge  -MS     Row Name 02/14/23 1130          Positioning and Restraints    Post Treatment Position bed  -MS     In Bed fowlers;call light within reach;encouraged to call for assist;side rails up x2;SCD pump applied  -MS           User Key  (r) = Recorded By, (t) = Taken By, (c) = Cosigned By    Initials Name Provider Type    Sonya Jin, PT, DPT, NCS Physical Therapist               Outcome Measures     Row Name 02/14/23 1130          How much help from another person do you currently need...    Turning from your back to your side while in flat bed without using bedrails? 2  -MS     Moving from lying on back to sitting on the side of a flat bed without bedrails? 1  -MS     Moving to and from a bed to a chair (including a wheelchair)? 1  -MS     Standing up from a chair using your arms (e.g., wheelchair, bedside chair)? 1  -MS     Climbing 3-5 steps with a railing? 1  -MS     To walk in hospital room? 1  -MS     AM-PAC 6 Clicks Score (PT) 7  -MS     Highest level of mobility 2 --> Bed activities/dependent transfer  -MS     Row Name 02/14/23 1130          Modified [place] Scale    Modified [place] Scale 5 - Severe disability.  Bedridden, incontinent, and requiring constant nursing care and attention.  -MS     Row Name 02/14/23 1130          Functional Assessment    Outcome Measure Options AM-PAC 6 Clicks Basic Mobility (PT);Modified [place]  -MS           User Key  (r) = Recorded By, (t) = Taken  By, (c) = Cosigned By    Initials Name Provider Type    MS Sonya Huston, PT, DPT, NCS Physical Therapist                             Physical Therapy Education     Title: PT OT SLP Therapies (In Progress)     Topic: Physical Therapy (In Progress)     Point: Mobility training (In Progress)     Learning Progress Summary           Patient Acceptance, E, NL by MS at 2/14/2023 [id number]    Comment: role of PT in his care                   Point: Home exercise program (Not Started)     Learner Progress:  Not documented in this visit.          Point: Body mechanics (Not Started)     Learner Progress:  Not documented in this visit.          Point: Precautions (Not Started)     Learner Progress:  Not documented in this visit.                      User Key     Initials Effective Dates Name Provider Type Discipline    MS 06/19/18 -  Sonya Huston, PT, DPT, NCS Physical Therapist PT              PT Recommendation and Plan  Planned Therapy Interventions (PT): balance training, bed mobility training, patient/family education, strengthening, ROM (range of motion), transfer training  Plan of Care Reviewed With: patient  Progress: no change  Outcome Evaluation: The patient presents with eyes open with gaze midline to the L. He demonstrates minimal movement of the R UE and R LE. He demonstrates full spontaneous movement of his L UE and L LE but not to command. He was able to roll in bed with assist and maintain position to be cleaned. He seemed to attempt to answer questions, but all answers where jargon. He did purposefully reach for his controller for the TV. PT will continue to work with him to encouarge direction following and increase mobility as tolerated. Recommend discharge to SNF.     Time Calculation:    PT Charges     Row Name 02/14/23 1130             Time Calculation    Start Time 1130  5 min chart review  -MS      Stop Time 1205  -MS      Time Calculation (min) 35 min  -MS      PT Received On 02/14/23  -MS      PT  Goal Re-Cert Due Date 02/24/23  -MS         Untimed Charges    PT Eval/Re-eval Minutes 40  -MS         Total Minutes    Untimed Charges Total Minutes 40  -MS       Total Minutes 40  -MS            User Key  (r) = Recorded By, (t) = Taken By, (c) = Cosigned By    Initials Name Provider Type    Sonya Jin, PT, DPT, NCS Physical Therapist              Therapy Charges for Today     Code Description Service Date Service Provider Modifiers Qty    [id number] HC PT EVAL MOD COMPLEXITY 3 2/14/2023 Sonya Huston PT, DPT, NCS GP 1          PT G-Codes  Outcome Measure Options: AM-PAC 6 Clicks Basic Mobility (PT), Modified [place]  AM-PAC 6 Clicks Score (PT): 7  Modified [place] Scale: 5 - Severe disability.  Bedridden, incontinent, and requiring constant nursing care and attention.  PT Discharge Summary  Anticipated Discharge Disposition (PT): skilled nursing facility    Sonya Huston, PT, DPT, NCS  2/14/2023

## 2023-02-14 NOTE — THERAPY EVALUATION
Patient Name: SRAAH Jones  : 1935    MRN: [id number]                              Today's Date: 2023       Admit Date: 2023    Visit Dx:     ICD-10-CM ICD-9-CM   1. Cerebrovascular accident (CVA), unspecified mechanism (HCC)  I63.9 434.91   2. Altered mental status, unspecified altered mental status type  R41.82 780.97   3. Dysphagia, unspecified type  R13.10 787.20   4. Impaired mobility  Z74.09 799.89   5. Impaired mobility and ADLs  Z74.09 V49.89    Z78.9      Patient Active Problem List   Diagnosis   • Cerebrovascular accident (CVA), unspecified mechanism (HCC)   • S/P hip hemiarthroplasty on 2023   • Essential hypertension   • Oropharyngeal dysphagia   • Hypernatremia   • Acute right heart failure (HCC)     Past Medical History:   Diagnosis Date   • Hypertension      Past Surgical History:   Procedure Laterality Date   • ORIF FEMUR FRACTURE        General Information     Row Name 23 1103          OT Time and Intention    Document Type evaluation  Pt found with R side flaccid and speech difficulties. large L parieto-occipital and small R frontal acute infarct, recent R hip fx with posterior THR.  -MM     Mode of Treatment occupational therapy  -MM     Row Name 23 1103          General Information    Patient Profile Reviewed yes  -MM     Prior Level of Function --  Pt was living at home prior to hip sx 2 weeks ago. Unsure of most recent PLOF 2' pt's speech difficulties this date.  -MM     Existing Precautions/Restrictions left;hip, posterior;fall  -MM     Barriers to Rehab medically complex;previous functional deficit;cognitive status;language barrier;physical barrier  -MM     Row Name 23 1103          Living Environment    People in Home facility resident  -MM     Row Name 23 1103          Cognition    Orientation Status (Cognition) unable/difficult to assess  -MM     Row Name 23 1103          Safety Issues, Functional Mobility    Safety Issues Affecting  Function (Mobility) friction/shear risk;safety precaution awareness;safety precautions follow-through/compliance;ability to follow commands  -MM     Impairments Affecting Function (Mobility) cognition;strength;visual/perceptual;sensation/sensory awareness;motor control;range of motion (ROM)  -MM     Cognitive Impairments, Mobility Safety/Performance attention;safety precaution awareness;safety precaution follow-through  -MM           User Key  (r) = Recorded By, (t) = Taken By, (c) = Cosigned By    Initials Name Provider Type    MM Flash Garcia, OTR/L Occupational Therapist                 Mobility/ADL's     Row Name 02/14/23 1103          Bed Mobility    Bed Mobility rolling left;rolling right  -MM     Rolling Left [place] (Bed Mobility) maximum assist (25% patient effort);verbal cues;nonverbal cues (demo/gesture)  -MM     Rolling Right [place] (Bed Mobility) maximum assist (25% patient effort);verbal cues;nonverbal cues (demo/gesture)  -MM     Assistive Device (Bed Mobility) draw sheet;bed rails  -MM     Comment, (Bed Mobility) further functional mobility d/f d/t pt fatigue  -MM     Row Name 02/14/23 1103          Activities of Daily Living    BADL Assessment/Intervention toileting;lower body dressing;upper body dressing  -MM     Row Name 02/14/23 1103          Toileting Assessment/Training    [place] Level (Toileting) toileting skills;dependent (less than 25% patient effort);verbal cues;nonverbal cues (demo/gesture);set up  incontinent of urine  -MM     Position (Toileting) supine  -MM     Row Name 02/14/23 1103          Lower Body Dressing Assessment/Training    [place] Level (Lower Body Dressing) doff;don;socks;dependent (less than 25% patient effort);set up;verbal cues;nonverbal cues (demo/gesture)  -MM     Position (Lower Body Dressing) supine  -MM     Row Name 02/14/23 1103          Upper Body Dressing Assessment/Training    [place] Level (Upper Body Dressing) doff;don;dependent  (less than 25% patient effort);verbal cues;nonverbal cues (demo/gesture);set up  hospital gown  -MM     Position (Upper Body Dressing) supine  -MM           User Key  (r) = Recorded By, (t) = Taken By, (c) = Cosigned By    Initials Name Provider Type    Flash Martinez, OTR/L Occupational Therapist               Obj/Interventions     Row Name 02/14/23 1103          Sensory Assessment (Somatosensory)    Sensory Assessment (Somatosensory) unable/difficult to assess;other (see comments)  Pt does not appear to react to light or deep touch to RUE.  -MM     Row Name 02/14/23 1103          Vision Assessment/Intervention    Visual Impairment/Limitations other (see comments)  pt largely keeps gaze/attention to L side of room.  -MM     [facility] Name 02/14/23 1103          Range of Motion Comprehensive    Comment, General Range of Motion LUE WFL AROM, minimal spontaneous AROM to RUE. Pt is resistive to ROM to R shoulder at less than 10% shoulder flexion does not grimmace just shakes his head no when asked to raise R shoulder  -MM     [facility] Name 02/14/23 1103          Strength Comprehensive (MMT)    Comment, General Manual Muscle Testing (MMT) Assessment LUE functionally 5/5. RUE 2-/5. R shoulder difficult to assess.  -MM           User Key  (r) = Recorded By, (t) = Taken By, (c) = Cosigned By    Initials Name Provider Type    Flash Martinez, OTR/L Occupational Therapist               Goals/Plan     Row Name 02/14/23 1103          Dressing Goal 1 (OT)    Activity/Device (Dressing Goal 1, OT) upper body dressing  -MM     [place]/Cues Needed (Dressing Goal 1, OT) minimum assist (75% or more patient effort);set-up required;verbal cues required  -MM     Time Frame (Dressing Goal 1, OT) long term goal (LTG);by discharge  -MM     Progress/Outcome (Dressing Goal 1, OT) new goal  -MM     [facility] Name 02/14/23 1103          Grooming Goal 1 (OT)    Activity/Device (Grooming Goal 1, OT) grooming skills, all  -MM     [place]  (Grooming Goal 1, OT) moderate assist (50-74% patient effort);verbal cues required;set-up required  -MM     Time Frame (Grooming Goal 1, OT) long term goal (LTG);by discharge  -MM     Strategies/Barriers (Grooming Goal 1, OT) utilizing RUE  -MM     Progress/Outcome (Grooming Goal 1, OT) new goal  -MM     Row Name 02/14/23 1103          Problem Specific Goal 1 (OT)    Problem Specific Goal 1 (OT) Pt will follow 100% of 1-step commands.  -MM     Time Frame (Problem Specific Goal 1, OT) long term goal (LTG);by discharge  -MM     Progress/Outcome (Problem Specific Goal 1, OT) new goal  -MM     Row Name 02/14/23 1103          Therapy Assessment/Plan (OT)    Planned Therapy Interventions (OT) activity tolerance training;adaptive equipment training;BADL retraining;cognitive/visual perception retraining;functional balance retraining;neuromuscular control/coordination retraining;occupation/activity based interventions;orthotic fabrication/fitting/training;passive ROM/stretching;patient/caregiver education/training;ROM/therapeutic exercise;strengthening exercise;transfer/mobility retraining  -MM           User Key  (r) = Recorded By, (t) = Taken By, (c) = Cosigned By    Initials Name Provider Type    MM Flash Garcia, OTR/L Occupational Therapist               Clinical Impression     Row Name 02/14/23 1103          Pain Scale: FACES Pre/Post-Treatment    Pain: FACES Scale, Pretreatment 0-->no hurt  -MM     Posttreatment Pain Rating 0-->no hurt  -MM     Row Name 02/14/23 1103          Plan of Care Review    Plan of Care Reviewed With patient;other (see comments)  RN and PCA  -MM     Progress no change  -MM     Outcome Evaluation OT evaluation completed. Pt alerts to name. Pt makes multiple attempts to verbally communicate with expressive aphasia noted. Pt at times nods appropriate. Pt attempts to follow commands, but noted resistance at times. LUE WFL AROM, minimal spontaneous AROM to RUE. Pt is resistive to ROM to R  shoulder at less than 10% shoulder flexion does not grimmace just shakes his head no when asked to raise R shoulder. LUE functional 5/5 strength, RUE 2-/5 overall. R shoulder difficult to assess. Pt largely keeps gaze/attention to L side but does come to atleast midline at times. Pt was max A for rolling R and L with glide pad. Pt was dependent for toilet hygiene, UB dressing and LB dressing tasks. Skilled OT recommended to address adls, functional mobility and endurance. Recommended d/c SNF.  -MM     Row Name 02/14/23 1103          Therapy Assessment/Plan (OT)    Rehab Potential (OT) good, to achieve stated therapy goals  -MM     Criteria for Skilled Therapeutic Interventions Met (OT) yes;meets criteria;skilled treatment is necessary  -MM     Therapy Frequency (OT) daily  -MM     Predicted Duration of Therapy Intervention (OT) until d/c  -MM     Row Name 02/14/23 1103          Therapy Plan Review/Discharge Plan (OT)    Anticipated Discharge Disposition (OT) skilled nursing facility  -MM     Row Name 02/14/23 1103          Positioning and Restraints    Pre-Treatment Position in bed  -MM     Post Treatment Position bed  -MM     In Bed notified nsg;fowlers;call light within reach;encouraged to call for assist;exit alarm on;side rails up x2;SCD pump applied  -MM           User Key  (r) = Recorded By, (t) = Taken By, (c) = Cosigned By    Initials Name Provider Type    MM Flash Garcia, OTR/L Occupational Therapist               Outcome Measures     Row Name 02/14/23 1103          How much help from another is currently needed...    Putting on and taking off regular lower body clothing? 1  -MM     Bathing (including washing, rinsing, and drying) 1  -MM     Toileting (which includes using toilet bed pan or urinal) 1  -MM     Putting on and taking off regular upper body clothing 1  -MM     Taking care of personal grooming (such as brushing teeth) 1  -MM     Eating meals 1  -MM     AM-PAC 6 Clicks Score (OT) 6  -MM      Row Name 02/14/23 1130          How much help from another person do you currently need...    Turning from your back to your side while in flat bed without using bedrails? 2  -MS     Moving from lying on back to sitting on the side of a flat bed without bedrails? 1  -MS     Moving to and from a bed to a chair (including a wheelchair)? 1  -MS     Standing up from a chair using your arms (e.g., wheelchair, bedside chair)? 1  -MS     Climbing 3-5 steps with a railing? 1  -MS     To walk in hospital room? 1  -MS     AM-PAC 6 Clicks Score (PT) 7  -MS     Highest level of mobility 2 --> Bed activities/dependent transfer  -MS     Row Name 02/14/23 1130 02/14/23 1103       Modified Cochran Scale    Modified Juan Scale 5 - Severe disability.  Bedridden, incontinent, and requiring constant nursing care and attention.  -MS 5 - Severe disability.  Bedridden, incontinent, and requiring constant nursing care and attention.  -MM    Row Name 02/14/23 1130 02/14/23 1103       Functional Assessment    Outcome Measure Options AM-PAC 6 Clicks Basic Mobility (PT);Modified Jaun  -MS AM-PAC 6 Clicks Daily Activity (OT);Modified Cochran  -MM          User Key  (r) = Recorded By, (t) = Taken By, (c) = Cosigned By    Initials Name Provider Type    Sonya Jin R, PT, DPT, NCS Physical Therapist    Flash Martinez, OTR/L Occupational Therapist                Occupational Therapy Education     Title: PT OT SLP Therapies (In Progress)     Topic: Occupational Therapy (In Progress)     Point: ADL training (In Progress)     Description:   Instruct learner(s) on proper safety adaptation and remediation techniques during self care or transfers.   Instruct in proper use of assistive devices.              Learning Progress Summary           Patient Acceptance, E, NR by SHEY at 2/14/2023 [id number]    Comment: OT role, benefits, POC                   Point: Home exercise program (Not Started)     Description:   Instruct learner(s) on appropriate  technique for monitoring, assisting and/or progressing therapeutic exercises/activities.              Learner Progress:  Not documented in this visit.          Point: Precautions (In Progress)     Description:   Instruct learner(s) on prescribed precautions during self-care and functional transfers.              Learning Progress Summary           Patient Acceptance, E, NR by MM at 2/14/2023 1559    Comment: OT role, benefits, POC                   Point: Body mechanics (In Progress)     Description:   Instruct learner(s) on proper positioning and spine alignment during self-care, functional mobility activities and/or exercises.              Learning Progress Summary           Patient Acceptance, E, NR by MM at 2/14/2023 1559    Comment: OT role, benefits, POC                               User Key     Initials Effective Dates Name Provider Type Discipline     06/16/21 -  Flash Garcia, OTR/L Occupational Therapist OT              OT Recommendation and Plan  Planned Therapy Interventions (OT): activity tolerance training, adaptive equipment training, BADL retraining, cognitive/visual perception retraining, functional balance retraining, neuromuscular control/coordination retraining, occupation/activity based interventions, orthotic fabrication/fitting/training, passive ROM/stretching, patient/caregiver education/training, ROM/therapeutic exercise, strengthening exercise, transfer/mobility retraining  Therapy Frequency (OT): daily  Plan of Care Review  Plan of Care Reviewed With: patient, other (see comments) (RN and PCA)  Progress: no change  Outcome Evaluation: OT evaluation completed. Pt alerts to name. Pt makes multiple attempts to verbally communicate with expressive aphasia noted. Pt at times nods appropriate. Pt attempts to follow commands, but noted resistance at times. LUE WFL AROM, minimal spontaneous AROM to RUE. Pt is resistive to ROM to R shoulder at less than 10% shoulder flexion does not grimmace  just shakes his head no when asked to raise R shoulder. LUE functional 5/5 strength, RUE 2-/5 overall. R shoulder difficult to assess. Pt largely keeps gaze/attention to L side but does come to atleast midline at times. Pt was max A for rolling R and L with glide pad. Pt was dependent for toilet hygiene, UB dressing and LB dressing tasks. Skilled OT recommended to address adls, functional mobility and endurance. Recommended d/c SNF.     Time Calculation:    Time Calculation- OT     Row Name 02/14/23 1103             Time Calculation- OT    OT Start Time 1103  +5 minutes additional chart review  -MM      OT Stop Time 1138  -MM      OT Time Calculation (min) 35 min  -MM      OT Received On 02/14/23  -MM      OT Goal Re-Cert Due Date 02/24/23  -MM            User Key  (r) = Recorded By, (t) = Taken By, (c) = Cosigned By    Initials Name Provider Type    MM Flash Garcia, OTR/L Occupational Therapist              Therapy Charges for Today     Code Description Service Date Service Provider Modifiers Qty    [id number]  OT EVAL MOD COMPLEXITY 3 2/14/2023 Flash Garcia, OTR/L GO 1               Flash Garcia OTR/L  2/14/2023

## 2023-02-14 NOTE — CONSULTS
[facility] HEART GROUP CONSULT NOTE    Referring Provider: Gopi Chino MD    Reason for Consultation: abnormal 2d echo    Chief Complaint   Patient presents with   • Stroke       Subjective .     History of present illness:  SARAH Jones is a 87 y.o. male with a known PMH significant for hypertension and recent hip repair.  He was life flighted to [facility] for strokelike symptoms.  EKG on arrival revealed sinus rhythm with PACs. High-sensitivity troponin level elevated at 69 with reflex value of 74.  WBCs 16.86.  Other labs unremarkable.  Chest x-ray revealed no acute cardiopulmonary abnormality.  CT of the head without contrast revealed findings concerning for early left MCA ischemia with old ischemic changes in the right cerebral hemisphere.  CT angiogram of the head revealed findings concerning for decreased flow to the branches of the left MCA artery.  CTA of the neck revealed moderate calcified plaque of the left carotid bulb resulting in at least 50% stenosis of the bulb and only mild calcified plaque of the right carotid bulb.  MRI of the brain revealed large left parieto-occipital and small right frontal acute infarct with no hemorrhage or midline shift.  2D echo revealed normal left ventricular systolic function with ejection fraction 66 to 70%, severely reduced right ventricular systolic function with severely dilated right ventricular cavity, severely dilated right atrial cavity, no significant valvular heart disease and negative saline test.  Cardiology services been consulted for abnormal 2D echo. Pt is currently non-verbal, so review of systems was unable to be obtained.     History  Past Medical History:   Diagnosis Date   • Hypertension    ,   Past Surgical History:   Procedure Laterality Date   • ORIF FEMUR FRACTURE     ,   Family History   Problem Relation Age of Onset   • No Known Problems Mother    • No Known Problems Father    ,   Social History     Tobacco Use   •  Smoking status: Never     Passive exposure: Never   • Smokeless tobacco: Never   Substance Use Topics   • Alcohol use: Never   • Drug use: Never   ,     Medications  Current Facility-Administered Medications   Medication Dose Route Frequency Provider Last Rate Last Admin   • aspirin tablet 325 mg  325 mg Oral Daily Coni Moreno MD        Or   • aspirin suppository 300 mg  300 mg Rectal Daily Coni Moreno MD   300 mg at 02/14/23 1118   • atorvastatin (LIPITOR) tablet 80 mg  80 mg Oral Nightly Coni Moreno MD       • dextrose (D5W) 5 % infusion  50 mL/hr Intravenous Continuous Yola Marquez APRN 50 mL/hr at 02/14/23 1452 50 mL/hr at 02/14/23 1452   • ondansetron (ZOFRAN) injection 4 mg  4 mg Intravenous Q6H PRN Coni Moreno MD       • sodium chloride 0.9 % flush 10 mL  10 mL Intravenous PRN Coni Moreno MD       • sodium chloride 0.9 % flush 10 mL  10 mL Intravenous Q12H Coni Moreno MD   10 mL at 02/13/23 2010   • sodium chloride 0.9 % flush 10 mL  10 mL Intravenous PRN Coni Moreno MD       • sodium chloride 0.9 % infusion 40 mL  40 mL Intravenous PRN Coni Moreno MD           Allergies:  Penicillins    Review of Systems  Review of Systems   Unable to perform ROS: patient nonverbal       Objective     Physical Exam:  Patient Vitals for the past 24 hrs:   BP Temp Temp src Pulse Resp SpO2   02/14/23 1300 125/66 98.4 °F (36.9 °C) Oral 68 18 96 %   02/14/23 0845 135/66 97.3 °F (36.3 °C) Axillary 58 18 98 %   02/14/23 0401 136/81 97.4 °F (36.3 °C) Oral 75 18 95 %   02/14/23 0043 135/75 97.4 °F (36.3 °C) Oral 65 18 93 %   02/13/23 1928 140/74 97.8 °F (36.6 °C) Axillary 77 16 96 %     Telemetry: SB/SR 52-73     Vitals and nursing note reviewed.   Constitutional:       General: Awake.      Appearance: Normal and healthy appearance. Well-developed, normal weight and not in distress.   Eyes:      General: Lids are normal.      Conjunctiva/sclera: Conjunctivae normal.       Pupils: Pupils are equal, round, and reactive to light.   HENT:      Head: Normocephalic and atraumatic.      Nose: Nose normal.   Neck:      Vascular: No JVR. JVD normal.   Pulmonary:      Effort: Pulmonary effort is normal.      Breath sounds: Normal breath sounds. No wheezing. No rhonchi. No rales.   Chest:      Chest wall: Not tender to palpatation.   Cardiovascular:      PMI at left midclavicular line. Normal rate. Regular rhythm. Normal S1. Normal S2.      Murmurs: There is no murmur.      No gallop. No click. No rub.   Pulses:     Intact distal pulses.   Edema:     Peripheral edema absent.   Abdominal:      General: Bowel sounds are normal.      Palpations: Abdomen is soft.      Tenderness: There is no abdominal tenderness.   Musculoskeletal: Normal range of motion.         General: No tenderness.      Cervical back: Normal range of motion. Skin:     General: Skin is warm and dry.   Neurological:      Mental Status: Alert and oriented to person, place and time.   Psychiatric:         Attention and Perception: Attention and perception normal.         Mood and Affect: Mood and affect normal.         Speech: Noncommunicative.         Behavior: Behavior is agitated. Behavior is cooperative.         Thought Content: Thought content normal.         Cognition and Memory: Cognition and memory normal.         Judgment: Judgment normal.         Results Review:   I reviewed the patient's new clinical results.    Lab Results (last 72 hours)     Procedure Component Value Units Date/Time    POC Glucose Once [[id number]]  (Normal) Collected: 02/14/23 0844    Specimen: Blood Updated: 02/14/23 0856     Glucose 112 mg/dL      Comment: : [id number] Cornell Millereter ID: [id number]       Hemoglobin A1c [[id number]]  (Normal) Collected: 02/14/23 0507    Specimen: Blood Updated: 02/14/23 0554     Hemoglobin A1C 5.40 %     Narrative:      Hemoglobin A1C Ranges:    Increased Risk for Diabetes  5.7% to 6.4%  Diabetes                      >= 6.5%  Diabetic Goal                < 7.0%    Lipid Panel [[id number]]  (Abnormal) Collected: 02/14/23 0507    Specimen: Blood Updated: 02/14/23 0548     Total Cholesterol 95 mg/dL      Triglycerides 111 mg/dL      HDL Cholesterol 25 mg/dL      LDL Cholesterol  49 mg/dL      VLDL Cholesterol 21 mg/dL      LDL/HDL Ratio 1.91    Narrative:      Cholesterol Reference Ranges  (U.S. Department of Health and Human Services ATP III Classifications)    Desirable          <200 mg/dL  Borderline High    200-239 mg/dL  High Risk          >240 mg/dL      Triglyceride Reference Ranges  (U.S. Department of Health and Human Services ATP III Classifications)    Normal           <150 mg/dL  Borderline High  150-199 mg/dL  High             200-499 mg/dL  Very High        >500 mg/dL    HDL Reference Ranges  (U.S. Department of Health and Human Services ATP III Classifications)    Low     <40 mg/dl (major risk factor for CHD)  High    >60 mg/dl ('negative' risk factor for CHD)        LDL Reference Ranges  (U.S. Department of Health and Human Services ATP III Classifications)    Optimal          <100 mg/dL  Near Optimal     100-129 mg/dL  Borderline High  130-159 mg/dL  High             160-189 mg/dL  Very High        >189 mg/dL    Comprehensive Metabolic Panel [[id number]]  (Abnormal) Collected: 02/14/23 0507    Specimen: Blood Updated: 02/14/23 0546     Glucose 136 mg/dL      BUN 31 mg/dL      Creatinine 1.00 mg/dL      Sodium 149 mmol/L      Potassium 3.5 mmol/L      Comment: Slight hemolysis detected by analyzer. Results may be affected.        Chloride 114 mmol/L      CO2 21.0 mmol/L      Calcium 8.0 mg/dL      Total Protein 5.4 g/dL      Albumin 3.3 g/dL      ALT (SGPT) 5 U/L      AST (SGOT) 20 U/L      Alkaline Phosphatase 79 U/L      Total Bilirubin 2.1 mg/dL      Globulin 2.1 gm/dL      A/G Ratio 1.6 g/dL      BUN/Creatinine Ratio 31.0     Anion Gap 14.0 mmol/L      eGFR 73.8 mL/min/1.73     Narrative:      GFR Normal  >60  Chronic Kidney Disease <60  Kidney Failure <15    The GFR formula is only valid for adults with stable renal function between ages 18 and 70.    CBC (No Diff) [[id number]]  (Abnormal) Collected: 02/14/23 0507    Specimen: Blood Updated: 02/14/23 0532     WBC 12.78 10*3/mm3      RBC 3.78 10*6/mm3      Hemoglobin 13.6 g/dL      Hematocrit 40.9 %      .2 fL      MCH 36.0 pg      MCHC 33.3 g/dL      RDW 14.1 %      RDW-SD 54.9 fl      MPV 10.5 fL      Platelets 123 10*3/mm3     POC Glucose Once [[id number]]  (Abnormal) Collected: 02/14/23 0425    Specimen: Blood Updated: 02/14/23 0436     Glucose 133 mg/dL      Comment: : [id number] Flavio JamesMeter ID: [id number]       POC Glucose Once [[id number]]  (Normal) Collected: 02/13/23 1822    Specimen: Blood Updated: 02/13/23 1832     Glucose 120 mg/dL      Comment: : [email or web address] Bright MeganMeter ID: [id number]       [place] Draw [[id number]] Collected: 02/13/23 0902    Specimen: Blood Updated: 02/13/23 1315    Narrative:      The following orders were created for panel order [place] Draw.  Procedure                               Abnormality         Status                     ---------                               -----------         ------                     Green Top (Gel)[[id number]]                                  Final result               Lavender Top[[id number]]                                                                Red Top[[id number]]                                          Final result               Gray Top[[id number]]                                         Final result               Light Blue Top[[id number]]                                                                Please view results for these tests on the individual orders.    Grant Top [[id number]] Collected: 02/13/23 0902    Specimen: Blood Updated: 02/13/23 1315     Extra Tube Hold for add-ons.     Comment: Auto resulted.       High Sensitivity Troponin T 2Hr [[id number]]   (Abnormal) Collected: 02/13/23 1106    Specimen: Blood Updated: 02/13/23 1138     HS Troponin T 74 ng/L      Troponin T Delta 5 ng/L     Narrative:      High Sensitive Troponin T Reference Range:  <10.0 ng/L- Negative Female for AMI  <15.0 ng/L- Negative Male for AMI  >=10 - Abnormal Female indicating possible myocardial injury.  >=15 - Abnormal Male indicating possible myocardial injury.   Clinicians would have to utilize clinical acumen, EKG, Troponin, and serial changes to determine if it is an Acute Myocardial Infarction or myocardial injury due to an underlying chronic condition.         High Sensitivity Troponin T [[id number]]  (Abnormal) Collected: 02/13/23 0902    Specimen: Blood Updated: 02/13/23 1050     HS Troponin T 69 ng/L     Narrative:      High Sensitive Troponin T Reference Range:  <10.0 ng/L- Negative Female for AMI  <15.0 ng/L- Negative Male for AMI  >=10 - Abnormal Female indicating possible myocardial injury.  >=15 - Abnormal Male indicating possible myocardial injury.   Clinicians would have to utilize clinical acumen, EKG, Troponin, and serial changes to determine if it is an Acute Myocardial Infarction or myocardial injury due to an underlying chronic condition.         Green Top (Gel) [[id number]] Collected: 02/13/23 0902    Specimen: Blood Updated: 02/13/23 1045     Extra Tube Hold for add-ons.     Comment: Auto resulted.       POC Glucose Once [[id number]]  (Normal) Collected: 02/13/23 1018    Specimen: Blood Updated: 02/13/23 1031     Glucose 127 mg/dL      Comment: : [email or web address] Gramlisch RobertMeter ID: [id number]       Red Top [[id number]] Collected: 02/13/23 0902    Specimen: Blood Updated: 02/13/23 1016     Extra Tube Hold for add-ons.     Comment: Auto resulted.       CBC & Differential [[id number]]  (Abnormal) Collected: 02/13/23 0902    Specimen: Blood Updated: 02/13/23 0950    Narrative:      The following orders were created for panel order CBC & Differential.  Procedure                                Abnormality         Status                     ---------                               -----------         ------                     CBC Auto Differential[[id number]]        Abnormal            Final result                 Please view results for these tests on the individual orders.    CBC Auto Differential [[id number]]  (Abnormal) Collected: 02/13/23 0902    Specimen: Blood Updated: 02/13/23 0950     WBC 16.86 10*3/mm3      RBC 4.14 10*6/mm3      Hemoglobin 14.8 g/dL      Hematocrit 44.9 %      .5 fL      MCH 35.7 pg      MCHC 33.0 g/dL      RDW 14.2 %      RDW-SD 55.8 fl      MPV 10.2 fL      Platelets 163 10*3/mm3     Manual Differential [[id number]]  (Abnormal) Collected: 02/13/23 0902    Specimen: Blood Updated: 02/13/23 0950     Neutrophil % 85.1 %      Lymphocyte % 0.0 %      Monocyte % 9.9 %      Eosinophil % 1.0 %      Bands %  1.0 %      Metamyelocyte % 2.0 %      Myelocyte % 1.0 %      Neutrophils Absolute 14.52 10*3/mm3      Lymphocytes Absolute 0.00 10*3/mm3      Monocytes Absolute 1.67 10*3/mm3      Eosinophils Absolute 0.17 10*3/mm3      Anisocytosis Slight/1+     Crenated RBC's Slight/1+     Macrocytes Slight/1+     WBC Morphology Normal     Platelet Morphology Normal    Comprehensive Metabolic Panel [[id number]]  (Abnormal) Collected: 02/13/23 0902    Specimen: Blood Updated: 02/13/23 0939     Glucose 143 mg/dL      BUN 32 mg/dL      Creatinine 1.06 mg/dL      Sodium 145 mmol/L      Potassium 3.5 mmol/L      Comment: Slight hemolysis detected by analyzer. Results may be affected.        Chloride 107 mmol/L      CO2 23.0 mmol/L      Calcium 8.5 mg/dL      Total Protein 6.3 g/dL      Albumin 3.6 g/dL      ALT (SGPT) 5 U/L      AST (SGOT) 20 U/L      Alkaline Phosphatase 95 U/L      Total Bilirubin 2.5 mg/dL      Globulin 2.7 gm/dL      A/G Ratio 1.3 g/dL      BUN/Creatinine Ratio 30.2     Anion Gap 15.0 mmol/L      eGFR 68.8 mL/min/1.73     Narrative:      GFR Normal  >60  Chronic Kidney Disease <60  Kidney Failure <15    The GFR formula is only valid for adults with stable renal function between ages 18 and 70.    Protime-INR [[id number]]  (Abnormal) Collected: 02/13/23 0902    Specimen: Blood Updated: 02/13/23 0928     Protime 15.7 Seconds      INR 1.24          No results found for: ECHOEFEST    Imaging Results (Last 72 Hours)     Procedure Component Value Units Date/Time    MRI Brain Without Contrast [[id number]] Collected: 02/13/23 1828     Updated: 02/13/23 1834    Narrative:      EXAMINATION: MRI BRAIN WO CONTRAST-     2/13/2023 5:38 PM CST     HISTORY: Neuro deficit, acute, stroke suspected; I63.9-Cerebral  infarction, unspecified; R41.82-Altered mental status, unspecified;  R13.10-Dysphagia, unspecified.     Noncontrast MR imaging of the brain.  Axial and sagittal sequences.     Large acute left MCA infarct involving an area measuring approximately 9  x 3 cm.     Small cortical infarct in the right frontal lobe involving an area  measuring approximately 2 x 1 cm.     No hemorrhage or mass effect.  No midline shift.     Ventricle size is normal.     The visualized paranasal sinuses are clear.     There is prominent diffuse atrophy and mild small vessel disease.     Summary:  1. Large left parieto-occipital and small right frontal acute infarct.  2. No hemorrhage or midline shift.              This report was finalized on 02/13/2023 18:31 by Dr. Pradeep Parra MD.    CT Angiogram Head w AI Analysis of LVO [[id number]] Collected: 02/13/23 1009     Updated: 02/13/23 1017    Narrative:      CT ANGIOGRAM HEAD W AI ANALYSIS OF LVO- 2/13/2023 9:04 AM CST     HISTORY: Neuro deficit, acute stroke suspected, right-sided weakness  with speech impediment     COMPARISON: Nonenhanced CT head exam 02/13/2023     DOSE LENGTH PRODUCT: 161 mGy cm. Automated exposure control was also  utilized to decrease patient radiation dose.     TECHNIQUE: Axial images of the brain are performed following  IV  contrast. 2-D and maximal intensity projectional reconstructed images  are reviewed. Poor contrast bolus related to poor cardiac output. Mild  motion artifact. AI analysis of LVO was utilized.        FINDINGS:  Mild contrast enhancement of the distal internal carotid  arteries with mild enhancement of the anterior and middle cerebral  arteries. A discrete central thrombus is not localized within these  vessels. There is minimal if any contrast seen within the posterior  cerebral and small caliber basilar artery resulting in diminished  assessment. The RAPID analysis suggests decreased enhancement of the  left MCA branches.       Impression:      1. Suboptimal contrast enhancement related to poor cardiac output and a  summation artifact. Findings concerning for decreased flow to the  branches of the left MCA artery. Please refer to dictation above.  This report was finalized on 02/13/2023 10:14 by Dr. Alanna Rivera MD.    CT Angiogram Neck [[id number]] Collected: 02/13/23 1002     Updated: 02/13/23 1012    Narrative:      CT ANGIOGRAM NECK- 2/13/2023 9:04 AM CST     HISTORY: Neuro deficit, acute stroke suspected, right paralysis with  speech impediment     COMPARISON: None     DOSE LENGTH PRODUCT: 160 mGy cm. Automated exposure control was also  utilized to decrease patient radiation dose.     TECHNIQUE: Axial images of the neck are performed following IV contrast.  2-D and maximal intensity projectional reconstructed images are  reviewed.     FINDINGS:  Contrast is present within the left subclavian,  brachiocephalic veins and SVC with mild contrast suspected within the  arch of the thoracic aorta. Findings likely represent sequelae of poor  cardiac output. Patient with persistent motion. Image quality degraded  for these reasons.     There is moderate calcification within the arch of the thoracic aorta.  No significant contrast present within the common, internal, and  external carotid arteries to assess  severity of carotid artery stenosis.  There is moderate calcified plaque of the left carotid bulb extending  into the proximal left internal carotid artery which results in at least  50% stenosis of the carotid bulb. Only mild calcified plaque of the  right carotid bulb. Hypoplasia suspected of the left vertebral artery.  No significant vertebral artery contrast enhancement during the exam. No  convincing aneurysm localized.     No soft tissue mass or pathologic lymphadenopathy identified within the  neck.     Calcified granuloma the left lung apex. Moderate to advanced  degenerative change of the cervical spine.       Impression:      1. Imaging is degraded by the patient's poor cardiac output and timing  of contrast bolus as well as repetitive patient motion artifact. Poor  enhancement of the extracranial carotid and vertebral arteries during  image acquisition. Moderate calcified plaque of the left carotid bulb  resulting in at least 50% stenosis of the bulb. Only mild calcified  plaque of the right carotid bulb. Moderate atherosclerosis of the normal  caliber thoracic aorta.  This report was finalized on 02/13/2023 10:09 by Dr. Alanna Rivera MD.    CT Head Without Contrast Stroke Protocol [[id number]] Collected: 02/13/23 0951     Updated: 02/13/23 1005    Narrative:      CT HEAD WO CONTRAST STROKE PROTOCOL- 2/13/2023 9:16 AM CST     HISTORY: Neuro deficit, acute, stroke suspected, right sided paralysis  and speech impediment     COMPARISON: None     DOSE LENGTH PRODUCT: 793 mGy cm. Automated exposure control was also  utilized to decrease patient radiation dose.     TECHNIQUE: Axial images the brain are obtained without contrast     FINDINGS:  There is questionable hyperdense left M2/3 cerebral artery  which may be seen with left MCA ischemia. Questionable loss of the  gray-white matter differentiation along the left insular cortex. There  is no intracranial hemorrhage. Old ischemic changes of the right  frontal  as well as right parieto-occipital region. There are chronic small  vessel ischemic changes. No extra-axial hematoma. No mass effect at this  time. Mild generalized volume loss.     Visible paranasal sinuses and mastoid air cells are well-aerated.       Impression:      1. Findings concerning for early left MCA ischemia with a hyperdense  left MCA sign and loss of gray-white matter differentiation along the  left insular cortex. No intracranial hemorrhage or prominent mass  effect.  2. Old ischemic changes right cerebral hemisphere with chronic small  vessel ischemia.     Comments: Findings called to Dr. Bradford in the ER at 10:00 AM on  02/13/2023  This report was finalized on 02/13/2023 10:02 by Dr. Alanna Rivera MD.    XR Chest 1 View [[id number]] Collected: 02/13/23 0939     Updated: 02/13/23 0943    Narrative:      EXAMINATION: XR CHEST 1 VW- 2/13/2023 9:39 AM CST     HISTORY: Acute Stroke Protocol (Onset < 12 hrs).     REPORT: A frontal view of the chest was obtained.     COMPARISON: There are no correlative imaging studies for comparison.     The lungs are clear, mildly hypoexpanded. There is ectasia of the  thoracic aorta, exaggerated by mild rotation of the patient. Heart size  is normal. No pneumothorax or pleural effusion is identified. The  osseous structures and upper abdomen appear unremarkable.       Impression:      No acute cardiopulmonary abnormality.  This report was finalized on 02/13/2023 09:40 by Dr. Jose E Barreto MD.        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  Cerebrovascular accident- large left parieto-occipital and small right frontal acute infarct on MRI of the brain yesterday.  Neurology following.  2.  Acute right heart failure- severe right ventricular dilation with severely  reduced right ventricular systolic function on 2D echo yesterday. No clinical signs of volume overload at this time. Monitor volume status closely.   3.  Status post hip hemiarthroplasty on 2/1/2023- check D-dimer. Check CT angiogram of the chest for potential pulmonary embolism.  4.  Essential hypertension- blood pressures acceptable given recent ischemic stroke.  Continue to monitor.  5.  Hyponatremia- sodium 149 this a.m. continue to monitor.  Management per primary team.    Further orders per Dr. San upon his evaluation of the patient.     Thank you for the consultation, cardiology will gladly continue to follow.     BETO Kendrick  2/14/2023  15:54 CST      Please note this cardiology consultation note is the result of a face to face consultation with the patient, in addition to reviewing medical records at length by myself,  Electronically signed by BETO Kendrick, 02/14/23, 2:38 PM CST.         Time: 45 minutes

## 2023-02-14 NOTE — CONSULTS
[facility]  INPATIENT WOUND & OSTOMY CONSULTATION    Today's Date: 02/14/23    Patient Name: SARAH Jones  MRN: [id number]  [facility]: [id number]  PCP: Gopi Chino MD  Referring Provider:   Consulting Provider (From admission, onward)    Start Ordered     Status Ordering Provider    02/13/23 1558 02/13/23 1557  Inpatient Wound Care Provider Consult  Once        Specialty:  Wound Care  Provider:  Courtney Roman APRN Acknowledged EBENIBO, SOTONTE E         Attending Provider: Coni Moreno MD  Length of Stay: 1    SUBJECTIVE   Chief Complaint: Wound of sacrum    HPI: SARAH Jones, a 87 y.o.male, presents with a past medical history of hypertension and recent right hip fracture repair 2 weeks prior to admission.  A full past medical history as listed below.  Patient presented to the hospital on 2/13 due to confusion and right arm weakness.  CT of the brain showed features consistent with left MCA stroke.    Inpatient wound care consulted due to wound to sacrum.  Patient is found to have a deep tissue injury of sacral region.  Wounds were first assessed on admission.  Pictures were taken and uploaded to chart.      Visit Dx:    ICD-10-CM ICD-9-CM   1. Cerebrovascular accident (CVA), unspecified mechanism (HCC)  I63.9 434.91   2. Altered mental status, unspecified altered mental status type  R41.82 780.97   3. Dysphagia, unspecified type  R13.10 787.20     Patient Active Problem List   Diagnosis   • Cerebrovascular accident (CVA), unspecified mechanism (HCC)       History:   Past Medical History:   Diagnosis Date   • Hypertension      Past Surgical History:   Procedure Laterality Date   • ORIF FEMUR FRACTURE       Social History     Socioeconomic History   • Marital status: Single   Tobacco Use   • Smoking status: Never     Passive exposure: Never   • Smokeless tobacco: Never   Substance and Sexual Activity   • Alcohol use: Never   • Drug use: Never   • Sexual activity: Defer     Family  History   Problem Relation Age of Onset   • No Known Problems Mother    • No Known Problems Father        Allergies:  Allergies   Allergen Reactions   • Penicillins Unknown - Low Severity       Medications:    Current Facility-Administered Medications:   •  aspirin tablet 325 mg, 325 mg, Oral, Daily **OR** aspirin suppository 300 mg, 300 mg, Rectal, Daily, Coni Moreno MD, 300 mg at 02/14/23 1118  •  atorvastatin (LIPITOR) tablet 80 mg, 80 mg, Oral, Nightly, Coni Moreno MD  •  dextrose (D5W) 5 % infusion, 50 mL/hr, Intravenous, Continuous, Yola Marquez APRN  •  ondansetron (ZOFRAN) injection 4 mg, 4 mg, Intravenous, Q6H PRN, Coni Moreno MD  •  sodium chloride 0.9 % flush 10 mL, 10 mL, Intravenous, PRN, Coni Moreno MD  •  sodium chloride 0.9 % flush 10 mL, 10 mL, Intravenous, Q12H, Coni Moreno MD, 10 mL at 02/13/23 2010  •  sodium chloride 0.9 % flush 10 mL, 10 mL, Intravenous, PRN, Coni Moreno MD  •  sodium chloride 0.9 % infusion 40 mL, 40 mL, Intravenous, PRN, Coni Moreno MD    Review of Systems:   Review of Systems   Unable to perform ROS: Mental status change         OBJECTIVE     Vitals:    02/14/23 1300   BP: 125/66   Pulse: 68   Resp: 18   Temp: 98.4 °F (36.9 °C)   SpO2: 96%       PHYSICAL EXAM:   Physical Exam  Vitals and nursing note reviewed.   Constitutional:       General: He is awake.      Appearance: He is normal weight.   HENT:      Head: Normocephalic and atraumatic.   Eyes:      General: Lids are normal. Gaze aligned appropriately.   Cardiovascular:      Rate and Rhythm: Normal rate and regular rhythm.   Pulmonary:      Effort: Pulmonary effort is normal. No respiratory distress.   Abdominal:      General: Abdomen is flat.      Palpations: Abdomen is soft.   Musculoskeletal:      Cervical back: Normal range of motion and neck supple.   Skin:     General: Skin is warm and dry.      Findings: Erythema and wound present.      Comments: Patient has  deep tissue injury of sacral region.  Area is dark maroon and purple with blanchable and nonblanchable areas with erythema of periwound area.  There is superficial skin loss over area.  Scant amount of serous drainage.   Neurological:      Mental Status: He is disoriented and confused.      Motor: Weakness present.   Psychiatric:         Attention and Perception: He is inattentive.         Mood and Affect: Mood normal.            Results Review:  Lab Results (last 48 hours)     Procedure Component Value Units Date/Time    POC Glucose Once [[id number]]  (Normal) Collected: 02/14/23 0844    Specimen: Blood Updated: 02/14/23 0856     Glucose 112 mg/dL      Comment: : [id number] Cornell Millereter ID: [id number]       Hemoglobin A1c [[id number]]  (Normal) Collected: 02/14/23 0507    Specimen: Blood Updated: 02/14/23 0554     Hemoglobin A1C 5.40 %     Narrative:      Hemoglobin A1C Ranges:    Increased Risk for Diabetes  5.7% to 6.4%  Diabetes                     >= 6.5%  Diabetic Goal                < 7.0%    Lipid Panel [[id number]]  (Abnormal) Collected: 02/14/23 0507    Specimen: Blood Updated: 02/14/23 0548     Total Cholesterol 95 mg/dL      Triglycerides 111 mg/dL      HDL Cholesterol 25 mg/dL      LDL Cholesterol  49 mg/dL      VLDL Cholesterol 21 mg/dL      LDL/HDL Ratio 1.91    Narrative:      Cholesterol Reference Ranges  (U.S. Department of Health and Human Services ATP III Classifications)    Desirable          <200 mg/dL  Borderline High    200-239 mg/dL  High Risk          >240 mg/dL      Triglyceride Reference Ranges  (U.S. Department of Health and Human Services ATP III Classifications)    Normal           <150 mg/dL  Borderline High  150-199 mg/dL  High             200-499 mg/dL  Very High        >500 mg/dL    HDL Reference Ranges  (U.S. Department of Health and Human Services ATP III Classifications)    Low     <40 mg/dl (major risk factor for CHD)  High    >60 mg/dl ('negative' risk factor for  CHD)        LDL Reference Ranges  (U.S. Department of Health and Human Services ATP III Classifications)    Optimal          <100 mg/dL  Near Optimal     100-129 mg/dL  Borderline High  130-159 mg/dL  High             160-189 mg/dL  Very High        >189 mg/dL    Comprehensive Metabolic Panel [[id number]]  (Abnormal) Collected: 02/14/23 0507    Specimen: Blood Updated: 02/14/23 0546     Glucose 136 mg/dL      BUN 31 mg/dL      Creatinine 1.00 mg/dL      Sodium 149 mmol/L      Potassium 3.5 mmol/L      Comment: Slight hemolysis detected by analyzer. Results may be affected.        Chloride 114 mmol/L      CO2 21.0 mmol/L      Calcium 8.0 mg/dL      Total Protein 5.4 g/dL      Albumin 3.3 g/dL      ALT (SGPT) 5 U/L      AST (SGOT) 20 U/L      Alkaline Phosphatase 79 U/L      Total Bilirubin 2.1 mg/dL      Globulin 2.1 gm/dL      A/G Ratio 1.6 g/dL      BUN/Creatinine Ratio 31.0     Anion Gap 14.0 mmol/L      eGFR 73.8 mL/min/1.73     Narrative:      GFR Normal >60  Chronic Kidney Disease <60  Kidney Failure <15    The GFR formula is only valid for adults with stable renal function between ages 18 and 70.    CBC (No Diff) [[id number]]  (Abnormal) Collected: 02/14/23 0507    Specimen: Blood Updated: 02/14/23 0532     WBC 12.78 10*3/mm3      RBC 3.78 10*6/mm3      Hemoglobin 13.6 g/dL      Hematocrit 40.9 %      .2 fL      MCH 36.0 pg      MCHC 33.3 g/dL      RDW 14.1 %      RDW-SD 54.9 fl      MPV 10.5 fL      Platelets 123 10*3/mm3     POC Glucose Once [[id number]]  (Abnormal) Collected: 02/14/23 0425    Specimen: Blood Updated: 02/14/23 0436     Glucose 133 mg/dL      Comment: : [id number] Flavio JamesMeter ID: [id number]       POC Glucose Once [[id number]]  (Normal) Collected: 02/13/23 1822    Specimen: Blood Updated: 02/13/23 1832     Glucose 120 mg/dL      Comment: : [email or web address] Bright MeganMeter ID: [id number]       [place] Draw [[id number]] Collected: 02/13/23 0902    Specimen: Blood Updated: 02/13/23  5050    Narrative:      The following orders were created for panel order [place] Draw.  Procedure                               Abnormality         Status                     ---------                               -----------         ------                     Green Top (Gel)[[id number]]                                  Final result               Lavender Top[[id number]]                                                                Red Top[[id number]]                                          Final result               Gray Top[[id number]]                                         Final result               Light Blue Top[[id number]]                                                                Please view results for these tests on the individual orders.    Grant Top [[id number]] Collected: 02/13/23 0902    Specimen: Blood Updated: 02/13/23 1315     Extra Tube Hold for add-ons.     Comment: Auto resulted.       High Sensitivity Troponin T 2Hr [[id number]]  (Abnormal) Collected: 02/13/23 1106    Specimen: Blood Updated: 02/13/23 1138     HS Troponin T 74 ng/L      Troponin T Delta 5 ng/L     Narrative:      High Sensitive Troponin T Reference Range:  <10.0 ng/L- Negative Female for AMI  <15.0 ng/L- Negative Male for AMI  >=10 - Abnormal Female indicating possible myocardial injury.  >=15 - Abnormal Male indicating possible myocardial injury.   Clinicians would have to utilize clinical acumen, EKG, Troponin, and serial changes to determine if it is an Acute Myocardial Infarction or myocardial injury due to an underlying chronic condition.         High Sensitivity Troponin T [[id number]]  (Abnormal) Collected: 02/13/23 0902    Specimen: Blood Updated: 02/13/23 1050     HS Troponin T 69 ng/L     Narrative:      High Sensitive Troponin T Reference Range:  <10.0 ng/L- Negative Female for AMI  <15.0 ng/L- Negative Male for AMI  >=10 - Abnormal Female indicating possible myocardial injury.  >=15 - Abnormal Male indicating possible  myocardial injury.   Clinicians would have to utilize clinical acumen, EKG, Troponin, and serial changes to determine if it is an Acute Myocardial Infarction or myocardial injury due to an underlying chronic condition.         Green Top (Gel) [[id number]] Collected: 02/13/23 0902    Specimen: Blood Updated: 02/13/23 1045     Extra Tube Hold for add-ons.     Comment: Auto resulted.       POC Glucose Once [[id number]]  (Normal) Collected: 02/13/23 1018    Specimen: Blood Updated: 02/13/23 1031     Glucose 127 mg/dL      Comment: : rgramlis Gramlisch RobertMeter ID: [id number]       Red Top [[id number]] Collected: 02/13/23 0902    Specimen: Blood Updated: 02/13/23 1016     Extra Tube Hold for add-ons.     Comment: Auto resulted.       CBC & Differential [[id number]]  (Abnormal) Collected: 02/13/23 0902    Specimen: Blood Updated: 02/13/23 0950    Narrative:      The following orders were created for panel order CBC & Differential.  Procedure                               Abnormality         Status                     ---------                               -----------         ------                     CBC Auto Differential[[id number]]        Abnormal            Final result                 Please view results for these tests on the individual orders.    CBC Auto Differential [[id number]]  (Abnormal) Collected: 02/13/23 0902    Specimen: Blood Updated: 02/13/23 0950     WBC 16.86 10*3/mm3      RBC 4.14 10*6/mm3      Hemoglobin 14.8 g/dL      Hematocrit 44.9 %      .5 fL      MCH 35.7 pg      MCHC 33.0 g/dL      RDW 14.2 %      RDW-SD 55.8 fl      MPV 10.2 fL      Platelets 163 10*3/mm3     Manual Differential [[id number]]  (Abnormal) Collected: 02/13/23 0902    Specimen: Blood Updated: 02/13/23 0950     Neutrophil % 85.1 %      Lymphocyte % 0.0 %      Monocyte % 9.9 %      Eosinophil % 1.0 %      Bands %  1.0 %      Metamyelocyte % 2.0 %      Myelocyte % 1.0 %      Neutrophils Absolute 14.52 10*3/mm3       Lymphocytes Absolute 0.00 10*3/mm3      Monocytes Absolute 1.67 10*3/mm3      Eosinophils Absolute 0.17 10*3/mm3      Anisocytosis Slight/1+     Crenated RBC's Slight/1+     Macrocytes Slight/1+     WBC Morphology Normal     Platelet Morphology Normal    Comprehensive Metabolic Panel [[id number]]  (Abnormal) Collected: 02/13/23 0902    Specimen: Blood Updated: 02/13/23 0939     Glucose 143 mg/dL      BUN 32 mg/dL      Creatinine 1.06 mg/dL      Sodium 145 mmol/L      Potassium 3.5 mmol/L      Comment: Slight hemolysis detected by analyzer. Results may be affected.        Chloride 107 mmol/L      CO2 23.0 mmol/L      Calcium 8.5 mg/dL      Total Protein 6.3 g/dL      Albumin 3.6 g/dL      ALT (SGPT) 5 U/L      AST (SGOT) 20 U/L      Alkaline Phosphatase 95 U/L      Total Bilirubin 2.5 mg/dL      Globulin 2.7 gm/dL      A/G Ratio 1.3 g/dL      BUN/Creatinine Ratio 30.2     Anion Gap 15.0 mmol/L      eGFR 68.8 mL/min/1.73     Narrative:      GFR Normal >60  Chronic Kidney Disease <60  Kidney Failure <15    The GFR formula is only valid for adults with stable renal function between ages 18 and 70.    Protime-INR [[id number]]  (Abnormal) Collected: 02/13/23 0902    Specimen: Blood Updated: 02/13/23 0928     Protime 15.7 Seconds      INR 1.24        Imaging Results (Last 72 Hours)     Procedure Component Value Units Date/Time    MRI Brain Without Contrast [[id number]] Collected: 02/13/23 1828     Updated: 02/13/23 1834    Narrative:      EXAMINATION: MRI BRAIN WO CONTRAST-     2/13/2023 5:38 PM CST     HISTORY: Neuro deficit, acute, stroke suspected; I63.9-Cerebral  infarction, unspecified; R41.82-Altered mental status, unspecified;  R13.10-Dysphagia, unspecified.     Noncontrast MR imaging of the brain.  Axial and sagittal sequences.     Large acute left MCA infarct involving an area measuring approximately 9  x 3 cm.     Small cortical infarct in the right frontal lobe involving an area  measuring approximately 2 x 1 cm.      No hemorrhage or mass effect.  No midline shift.     Ventricle size is normal.     The visualized paranasal sinuses are clear.     There is prominent diffuse atrophy and mild small vessel disease.     Summary:  1. Large left parieto-occipital and small right frontal acute infarct.  2. No hemorrhage or midline shift.              This report was finalized on 02/13/2023 18:31 by Dr. Pradeep Parra MD.    CT Angiogram Head w AI Analysis of LVO [[id number]] Collected: 02/13/23 1009     Updated: 02/13/23 1017    Narrative:      CT ANGIOGRAM HEAD W AI ANALYSIS OF LVO- 2/13/2023 9:04 AM CST     HISTORY: Neuro deficit, acute stroke suspected, right-sided weakness  with speech impediment     COMPARISON: Nonenhanced CT head exam 02/13/2023     DOSE LENGTH PRODUCT: 161 mGy cm. Automated exposure control was also  utilized to decrease patient radiation dose.     TECHNIQUE: Axial images of the brain are performed following IV  contrast. 2-D and maximal intensity projectional reconstructed images  are reviewed. Poor contrast bolus related to poor cardiac output. Mild  motion artifact. AI analysis of LVO was utilized.        FINDINGS:  Mild contrast enhancement of the distal internal carotid  arteries with mild enhancement of the anterior and middle cerebral  arteries. A discrete central thrombus is not localized within these  vessels. There is minimal if any contrast seen within the posterior  cerebral and small caliber basilar artery resulting in diminished  assessment. The RAPID analysis suggests decreased enhancement of the  left MCA branches.       Impression:      1. Suboptimal contrast enhancement related to poor cardiac output and a  summation artifact. Findings concerning for decreased flow to the  branches of the left MCA artery. Please refer to dictation above.  This report was finalized on 02/13/2023 10:14 by Dr. Alanna Rivera MD.    CT Angiogram Neck [[id number]] Collected: 02/13/23 1002     Updated: 02/13/23 1012     Narrative:      CT ANGIOGRAM NECK- 2/13/2023 9:04 AM CST     HISTORY: Neuro deficit, acute stroke suspected, right paralysis with  speech impediment     COMPARISON: None     DOSE LENGTH PRODUCT: 160 mGy cm. Automated exposure control was also  utilized to decrease patient radiation dose.     TECHNIQUE: Axial images of the neck are performed following IV contrast.  2-D and maximal intensity projectional reconstructed images are  reviewed.     FINDINGS:  Contrast is present within the left subclavian,  brachiocephalic veins and SVC with mild contrast suspected within the  arch of the thoracic aorta. Findings likely represent sequelae of poor  cardiac output. Patient with persistent motion. Image quality degraded  for these reasons.     There is moderate calcification within the arch of the thoracic aorta.  No significant contrast present within the common, internal, and  external carotid arteries to assess severity of carotid artery stenosis.  There is moderate calcified plaque of the left carotid bulb extending  into the proximal left internal carotid artery which results in at least  50% stenosis of the carotid bulb. Only mild calcified plaque of the  right carotid bulb. Hypoplasia suspected of the left vertebral artery.  No significant vertebral artery contrast enhancement during the exam. No  convincing aneurysm localized.     No soft tissue mass or pathologic lymphadenopathy identified within the  neck.     Calcified granuloma the left lung apex. Moderate to advanced  degenerative change of the cervical spine.       Impression:      1. Imaging is degraded by the patient's poor cardiac output and timing  of contrast bolus as well as repetitive patient motion artifact. Poor  enhancement of the extracranial carotid and vertebral arteries during  image acquisition. Moderate calcified plaque of the left carotid bulb  resulting in at least 50% stenosis of the bulb. Only mild calcified  plaque of the right carotid bulb.  Moderate atherosclerosis of the normal  caliber thoracic aorta.  This report was finalized on 02/13/2023 10:09 by Dr. Alanna Rivera MD.    CT Head Without Contrast Stroke Protocol [[id number]] Collected: 02/13/23 0951     Updated: 02/13/23 1005    Narrative:      CT HEAD WO CONTRAST STROKE PROTOCOL- 2/13/2023 9:16 AM CST     HISTORY: Neuro deficit, acute, stroke suspected, right sided paralysis  and speech impediment     COMPARISON: None     DOSE LENGTH PRODUCT: 793 mGy cm. Automated exposure control was also  utilized to decrease patient radiation dose.     TECHNIQUE: Axial images the brain are obtained without contrast     FINDINGS:  There is questionable hyperdense left M2/3 cerebral artery  which may be seen with left MCA ischemia. Questionable loss of the  gray-white matter differentiation along the left insular cortex. There  is no intracranial hemorrhage. Old ischemic changes of the right frontal  as well as right parieto-occipital region. There are chronic small  vessel ischemic changes. No extra-axial hematoma. No mass effect at this  time. Mild generalized volume loss.     Visible paranasal sinuses and mastoid air cells are well-aerated.       Impression:      1. Findings concerning for early left MCA ischemia with a hyperdense  left MCA sign and loss of gray-white matter differentiation along the  left insular cortex. No intracranial hemorrhage or prominent mass  effect.  2. Old ischemic changes right cerebral hemisphere with chronic small  vessel ischemia.     Comments: Findings called to Dr. Bradford in the ER at 10:00 AM on  02/13/2023  This report was finalized on 02/13/2023 10:02 by Dr. Alanna Rivera MD.    XR Chest 1 View [[id number]] Collected: 02/13/23 0939     Updated: 02/13/23 0943    Narrative:      EXAMINATION: XR CHEST 1 VW- 2/13/2023 9:39 AM CST     HISTORY: Acute Stroke Protocol (Onset < 12 hrs).     REPORT: A frontal view of the chest was obtained.     COMPARISON: There are no correlative  imaging studies for comparison.     The lungs are clear, mildly hypoexpanded. There is ectasia of the  thoracic aorta, exaggerated by mild rotation of the patient. Heart size  is normal. No pneumothorax or pleural effusion is identified. The  osseous structures and upper abdomen appear unremarkable.       Impression:      No acute cardiopulmonary abnormality.  This report was finalized on 02/13/2023 09:40 by Dr. Jose E Barreto MD.             ASSESSMENT/PLAN       Examination and evaluation of wound(s) was performed.    DIAGNOSIS:   Pressure injury of deep tissue of sacral region  Impaired mobility and ADLs  Bowel and Bladder Incontinence      HOSPITAL PROBLEM LIST:    Cerebrovascular accident (CVA), unspecified mechanism (HCC)      PLAN:   Patient has DTI of sacral region.  Orders placed for wound to be managed using barrier cream and moisture/pressure management orders placed.       Start     Ordered    02/14/23 1313  Specialty Bed Dolphin FIS Mattress  Once        Comments: For Dolphin FIS Mattress, call [employer] to order a [employer] bed frame.  If bariatric/bariatric dolphin, [employer] provides frame, mattress, pump, and trapeze (if needed).  Nurse or HUC is to call [employer], the rental company, to order the equipment, provide patient's name, room number, and if in isolation. [phone].   Question:  Specialty Bed needed  Answer:  Dolphin FIS Mattress    02/14/23 1312    02/14/23 1312  Use Seat Cushion When Up In Chair  Continuous         02/14/23 1312    02/14/23 1310  Elevate Heels Off of Bed  Until Discontinued         02/14/23 1312    02/14/23 1310  Turn Patient  Now Then Every 2 Hours         02/14/23 1312    02/14/23 1310  Use Repositioning Wedge to Position Patient  Continuous        Comments: Use Comfort Glide repositioning sheet and wedges to position patient.    02/14/23 1312    Unscheduled  Apply Moisture Barrier After Any Incontinence  As Needed      Comments: Zguard or Calazime   Question:  Wound Care  Instructions  Answer:  Apply Moisture Barrier After Any Incontinence    02/14/23 1312                This document has been electronically signed by BETO Cox on 2/14/2023 13:12 CST

## 2023-02-14 NOTE — PLAN OF CARE
Goal Outcome Evaluation:  Plan of Care Reviewed With: other (see comments)        Progress: no change  Outcome Evaluation: RD assessment completed. Pt with dysphagia NPO diet. If pt to remain NPO 5 days or > may benefit from alternate means of nutrition support if clinically appropriate. Cont to follow for plan of care.

## 2023-02-15 PROBLEM — I26.99 BILATERAL PULMONARY EMBOLISM: Status: ACTIVE | Noted: 2023-01-01

## 2023-02-15 NOTE — THERAPY TREATMENT NOTE
Acute Care - Physical Therapy Treatment Note  [facility]     Patient Name: SARAH Jones  : 1935  MRN: [id number]  Today's Date: 2/15/2023      Visit Dx:     ICD-10-CM ICD-9-CM   1. Cerebrovascular accident (CVA), unspecified mechanism (HCC)  I63.9 434.91   2. Altered mental status, unspecified altered mental status type  R41.82 780.97   3. Dysphagia, unspecified type  R13.10 787.20   4. Impaired mobility  Z74.09 799.89   5. Impaired mobility and ADLs  Z74.09 V49.89    Z78.9      Patient Active Problem List   Diagnosis   • Cerebrovascular accident (CVA), unspecified mechanism (HCC)   • S/P hip hemiarthroplasty on 2023   • Essential hypertension   • Oropharyngeal dysphagia   • Hypernatremia   • Acute right heart failure (HCC)     Past Medical History:   Diagnosis Date   • Hypertension      Past Surgical History:   Procedure Laterality Date   • ORIF FEMUR FRACTURE       PT Assessment (last 12 hours)     PT Evaluation and Treatment     Row Name 02/15/23 0954          Physical Therapy Time and Intention    Subjective Information complains of;pain  -AB     Document Type therapy note (daily note)  -AB     Mode of Treatment physical therapy  -AB     Comment Try to sit up this PM, fatigued from bath  -AB     Row Name 02/15/23 0954          General Information    Existing Precautions/Restrictions right;hip, posterior;fall  -AB     Row Name 02/15/23 0954          Motor Skills    Comments, Therapeutic Exercise RLE AA-PROM and LLE AA-AROM supine 20 reps  -AB     Additional Documentation Comments, Therapeutic Exercise (Row)  -AB     Row Name             Wound 23 1554 Bilateral gluteal Pressure Injury    Wound - Properties Group Placement Date: 23  -MB Placement Time: 155  -MB Present on Hospital Admission: N  -MB Side: Bilateral  -MB Location: gluteal  -MB Primary Wound Type: Pressure inj  -MB    Retired Wound - Properties Group Placement Date: 23  -MB Placement Time:   -MB Present on Hospital  Admission: N  -MB Side: Bilateral  -MB Location: gluteal  -MB Primary Wound Type: Pressure inj  -MB    Retired Wound - Properties Group Date first assessed: 02/13/23  -MB Time first assessed: 1554  -MB Present on Hospital Admission: N  -MB Side: Bilateral  -MB Location: gluteal  -MB Primary Wound Type: Pressure inj  -MB    Row Name             Wound 02/13/23 1556 Right anterior greater trochanter Incision    Wound - Properties Group Placement Date: 02/13/23  -MB Placement Time: 1556  -MB Present on Hospital Admission: Y  -MB Side: Right  -MB Orientation: anterior  -MB Location: greater trochanter  -MB Primary Wound Type: Incision  -MB    Retired Wound - Properties Group Placement Date: 02/13/23  -MB Placement Time: 1556  -MB Present on Hospital Admission: Y  -MB Side: Right  -MB Orientation: anterior  -MB Location: greater trochanter  -MB Primary Wound Type: Incision  -MB    Retired Wound - Properties Group Date first assessed: 02/13/23  -MB Time first assessed: 1556  -MB Present on Hospital Admission: Y  -MB Side: Right  -MB Location: greater trochanter  -MB Primary Wound Type: Incision  -MB    Row Name 02/15/23 0954          Positioning and Restraints    Pre-Treatment Position in bed  -AB     Post Treatment Position bed  -AB     In Bed fowlers;call light within reach  -AB           User Key  (r) = Recorded By, (t) = Taken By, (c) = Cosigned By    Initials Name Provider Type    AB Yi Alonso PTA Physical Therapist Assistant    Raisa Hammond, RN Registered Nurse                Physical Therapy Education     Title: PT OT SLP Therapies (In Progress)     Topic: Physical Therapy (In Progress)     Point: Mobility training (In Progress)     Learning Progress Summary           Patient Acceptance, E, NL by MS at 2/14/2023 0295    Comment: role of PT in his care                   Point: Home exercise program (Not Started)     Learner Progress:  Not documented in this visit.          Point: Body mechanics (Not  Started)     Learner Progress:  Not documented in this visit.          Point: Precautions (Not Started)     Learner Progress:  Not documented in this visit.                      User Key     Initials Effective Dates Name Provider Type Discipline    MS 06/19/18 -  Sonya Huston, PT, DPT, NCS Physical Therapist PT              PT Recommendation and Plan             Time Calculation:    PT Charges     Row Name 02/15/23 0954             Time Calculation    Start Time 0954  -AB      Stop Time 1018  -AB      Time Calculation (min) 24 min  -AB      PT Received On 02/15/23  -AB         Time Calculation- PT    Total Timed Code Minutes- PT 24 minute(s)  -AB            User Key  (r) = Recorded By, (t) = Taken By, (c) = Cosigned By    Initials Name Provider Type    AB Yi Alonso, PTA Physical Therapist Assistant              Therapy Charges for Today     Code Description Service Date Service Provider Modifiers Qty    [id number] HC PT THER PROC EA 15 MIN 2/15/2023 Yi Alonso PTA GP 2          PT G-Codes  Outcome Measure Options: AM-PAC 6 Clicks Basic Mobility (PT), Modified [place]  AM-PAC 6 Clicks Score (PT): 7  AM-PAC 6 Clicks Score (OT): 6  Modified Juan Scale: 5 - Severe disability.  Bedridden, incontinent, and requiring constant nursing care and attention.    Yi Alonso PTA  2/15/2023

## 2023-02-15 NOTE — PROGRESS NOTES
"  Neurology Progress Note      Chief Complaint:    Large left parietal occipital infarct  Small right frontal infarct  Right heart cardiomyopathy  Elevated troponin    Subjective     Subjective:  Patient's daughter, Joy, is present at bedside.    With elevated troponin and right heart dilatation, CTA was obtained to evaluate for pulmonary embolus.  This study confirmed a large pulmonary embolus and hospitalists are requesting guidance as to whether or not they can proceed with Lovenox due to the size of the cerebral infarction.    The patient is also working with speech therapy today who states he is swallowing reflexively with honey thick liquids and puréed.    Today, he spoke to me, although severely dysarthric, \"I want to do everything.\"      Past Medical History:   Diagnosis Date   • Hypertension      Past Surgical History:   Procedure Laterality Date   • ORIF FEMUR FRACTURE       Family History   Problem Relation Age of Onset   • No Known Problems Mother    • No Known Problems Father      Social History     Tobacco Use   • Smoking status: Never     Passive exposure: Never   • Smokeless tobacco: Never   Substance Use Topics   • Alcohol use: Never   • Drug use: Never       Medications:  Current Facility-Administered Medications   Medication Dose Route Frequency Provider Last Rate Last Admin   • aspirin tablet 325 mg  325 mg Oral Daily Coni Moreno MD        Or   • aspirin suppository 300 mg  300 mg Rectal Daily Coni Moreno MD   300 mg at 02/14/23 1118   • atorvastatin (LIPITOR) tablet 80 mg  80 mg Oral Nightly Coni Moreno MD       • dextrose (D5W) 5 % infusion  50 mL/hr Intravenous Continuous Yola Marquez APRN 50 mL/hr at 02/14/23 1452 50 mL/hr at 02/14/23 1452   • ondansetron (ZOFRAN) injection 4 mg  4 mg Intravenous Q6H PRN Coni Moreno MD       • sodium chloride 0.9 % flush 10 mL  10 mL Intravenous PRN Coni Moreno MD       • sodium chloride 0.9 % flush 10 mL  10 mL " Intravenous Q12H Coni Moreno MD   10 mL at 02/14/23 2045   • sodium chloride 0.9 % flush 10 mL  10 mL Intravenous PRN Coni Moreno MD       • sodium chloride 0.9 % infusion 40 mL  40 mL Intravenous PRN Coni Moreno MD           Allergies:    Penicillins      Objective      Vital Signs  Temp:  [97.3 °F (36.3 °C)-98.4 °F (36.9 °C)] 98 °F (36.7 °C)  Heart Rate:  [53-68] 60  Resp:  [18-20] 18  BP: (125-143)/(57-70) 137/57    Physical Exam:    General Exam:  Head:  Normocephalic, atraumatic  HEENT:  Neck supple  Fundoscopic Exam:  No signs of disc edema  CVS:  Regular rate and rhythm.  No murmurs  Carotid Examination:  No bruits  Lungs:  Clear to auscultation  Abdomen:  Nontender, Nondistended  Extremities:  No signs of peripheral edema  Skin:  No rashes     Neurologic Exam:  Awake.  Alert.   Mixed receptive and expressive aphasia.   Following commands well.  Swallowing with SLP.  Gazes right of midline.  Visually tracks.  Pupils equal.     Corneal & gag reflex is intact.    Right lower facial droop.    Protrudes tongue without deviation.  Dysarthic, but speaking multi-word sentences.     Left arm and leg have full strength.  3 out of 5 strength on right arm and leg with an everted foot suggestive of weakness in the right lower extremity  Upgoing toe on right  Sensory examination reveals withdrawal from the right arm and leg to noxious stimulation  No tremor      Results Review:    I reviewed the patient's new clinical results and findings.    Lab Results (last 24 hours)     Procedure Component Value Units Date/Time    CBC (No Diff) [[id number]] Collected: 02/15/23 0637    Specimen: Blood Updated: 02/15/23 0715    Comprehensive Metabolic Panel [[id number]] Collected: 02/15/23 0637    Specimen: Blood Updated: 02/15/23 0715    D-dimer, Quantitative [[id number]]  (Abnormal) Collected: 02/14/23 1556    Specimen: Blood Updated: 02/14/23 1642     D-Dimer, Quantitative >20.00 MCGFEU/mL     Narrative:       "According to the assay 's published package insert, a normal (<0.50 MCGFEU/mL) D-dimer result in conjunction with a non-high clinical probability assessment, excludes deep vein thrombosis (DVT) and pulmonary embolism (PE) with high sensitivity.    D-dimer values increase with age and this can make VTE exclusion of an older population difficult. To address this, the [employer], based on best available evidence and recent guidelines, recommends that clinicians use age-adjusted D-dimer thresholds in patients greater than 50 years of age with: a) a low probability of PE who do not meet all Pulmonary Embolism Rule Out Criteria, or b) in those with intermediate probability of PE.   The formula for an age-adjusted D-dimer cut-off is \"age/100\".  For example, a 60 year old patient would have an age-adjusted cut-off of 0.60 MCGFEU/mL and an 80 year old 0.80 MCGFEU/mL.    POC Glucose Once [[id number]]  (Normal) Collected: 02/14/23 0844    Specimen: Blood Updated: 02/14/23 0856     Glucose 112 mg/dL      Comment: : [id number] [facility] ID: [id number]             Imaging Results (Last 24 Hours)     Procedure Component Value Units Date/Time    CT Angiogram Chest [[id number]] Collected: 02/14/23 1648     Updated: 02/14/23 1707    Narrative:      EXAMINATION: CT ANGIOGRAM CHEST-      2/14/2023 4:23 PM CST     HISTORY: Pulmonary embolism (PE) suspected, unknown D-dimer;  I63.9-Cerebral infarction, unspecified; R41.82-Altered mental status,  unspecified; R13.10-Dysphagia, unspecified; Z74.09-Other reduced  mobility; Z74.09-Other reduced mobility; Z78.9-Other specified health  status. Stroke. Decreased mobility with increased risk for DVT and PE.     In order to have a CT radiation dose as low as reasonably achievable  Automated Exposure Control was utilized for adjustment of the mA and/or  KV according to patient size.     DLP in mGycm= 299.     CT angiogram chest with and without IV " contrast.     Large bilateral pulmonary emboli.  High clot burden.  Right heart strain with RV/LV ratio of 53/27.     Thoracic aortic calcification with no aneurysm.  No mediastinal mass.     Chronic lung changes with hyperexpansion.  Mild dependent atelectasis.     No pneumonia, pneumothorax, or pleural effusion.     Bilateral renal cysts with the largest on the right measuring up to 10  cm.     Summary:  1. Large bilateral pulmonary emboli.  2. High clot burden with right heart strain.  3. This report was called to Kate on 3A at 5:00 PM.                                   This report was finalized on 02/14/2023 17:04 by Dr. Pradeep Parra MD.          Assessment/Plan     Hospital Problem List      Cerebrovascular accident (CVA), unspecified mechanism (HCC)    S/P hip hemiarthroplasty on 2/1/2023    Essential hypertension    Oropharyngeal dysphagia    Hypernatremia    Acute right heart failure (HCC)      Impression    • Large, left parietal occipital and right frontal infarct  ? Not a tPA candidate secondary to recent hip fracture aLikely left MCA event. Acute ischemic stroke in left MCA distribution which is a surgery in a noncompressible site, a direct contraindication to tPA  ? No evidence of large vessel occlusion although has poor contrast-enhancement of the [place]  • Low right ventricular systolic function/right heart cardiomyopathy  • Elevated troponin  • Oropharyngeal dysphagia  • Leukocytosis  • Hypocalcemia  • Hypertension  • Recent hip surgery and repair    Plan    • Repeat CT head today to assure no hemorrhagic conversion prior to starting anticoagulation.  • We will follow-up with hospitalist, after CT of head is complete, regarding anticoagulation.  • Family conference with daughter at bedside.  • MRI shown to daughter.  • Conference with SLP.  • Continue ASA.  If SLP clears, go to ASA 81mg po daily.  • Continue PT, OT & ST.          Rizwan Mas PA-C  02/15/23  07:35 CST        ADDENDUM:  Repeat CT of head does show some patchy areas of hemorrhagic conversion with a small area of hematoma as well as indicated on the image below.    Will contact hospitalist to discuss placing him on heparin drip opposed to Lovenox.  Will observe closely for any change in neurologic status.  This has been discussed and reviewed with Dr. Baird.          Medical Decision Making     Number/Complexity of Problems  1. Moderate  ? 1 undiagnosed new problem with uncertain prognosis -   ? 1 acute illness with systemic symptoms -   2. High  ? 1 acute or chronic illness that pose a threat to life/body function -   Level of medical decision making is high in the setting of large left parietal occipital and right frontal infarcts with dense right hemiparesis and severe mixed aphasia, elevated troponin, possible right heart failure/pulmonary embolus and need for anticoagulation.     MDM Data  • Moderate - 1/3 categories  • Extensive - 2/3 categories     1. Category 1: 3 of the following  ? Review of external notes  ? Review of results  ? Ordering of each unique test  ? Independent historian  2. Category 2:  Independent interpretation of test (ex: imaging)  3. Category 3:  Discussion of management with another provider  I have reviewed all the neuroimaging from yesterday including CT, CTA head and neck and MRI brain.  I reviewed these clinical findings with patient at bedside.  Reviewed findings with Dr. Baird and nursing.  Also reviewed this with patient's daughter at bedside.  We will also discuss this further with hospitalist today regarding results of CT and anticoagulation.     Treatment Plan  1. Moderate - Prescription Drug management  2. High  ? Drug therapy requiring intensive monitoring for toxicity  ? Decision regarding hospitalization or escalation of care  ? De-escalate care/DNR decisions  Continuation of aspirin and decision-making as to whether or not to proceed with anticoagulation in the setting  of large cerebral infarction at risk for hemorrhagic conversion.

## 2023-02-15 NOTE — PLAN OF CARE
Goal Outcome Evaluation:  Plan of Care Reviewed With: patient        Progress: improving  Outcome Evaluation: Pt sitting EOB in room with PTA. Pt noted to be using his RUE voluntarily. Pt demonstrated some . Pt presented with RW and was able to stand and ambulate with CGA. Pt still having difficulty with expressive aphasia but was able to follow 30% of simple commands. Pt was also able to complete simple tasks when provided object. Pt showed brief and pt was able to initiate lifting of L leg to thread into pant leg. Pt provided tooth brush with a small amount of tooth paste and was able to brush his teeth with no verbal cues. Pt provided wash cloth and used both R and L hand to assist in washing his face. Pt ambulated back to recliner and placed next to significant other. Pt then became very somnolent and began to gaze blankly at the ceiling. STOUT attempted to alert pt, pt then began to gaze to L and  on R hand increased. STOUT alerted the nurse, assisted in getting pt back to bed. Pt was left with nursing staff who then called RRT.

## 2023-02-15 NOTE — PLAN OF CARE
Goal Outcome Evaluation:  Plan of Care Reviewed With: patient, caregiver, daughter        Progress: improving     Patient seen to address dysphagia. Patient denied pain. Patient daughter present. Patient improved with following simple verbal commands with demonstration. Patient completing functional tasks with increased use of the right hand and visual scanning. Patient appears to comprehend some simple conversations. Patient is expressing yes and no with occasional spontaneous phrases. Patient will complete a communication eval on this date. Patient was given puree food and honey thick liquids from a straw and spoon. Patient displayed slow oral control with slow swallow response timing. Patient consumption was effective with extended time, displaying no overt s/s aspiration at bedside. Patient displayed groping and decreased oral control. Therefore no advanced textures were given. Patient is safe to start a puree diet with honey thick liquids from a spoon only with staff or family feeding. Patient swallow strategies reviewed with staff, patient, and family with expressed understanding stated. ST will proceed with communication eval later on this date.   Clotilde Salgado, SLP 2/15/2023 09:32 CST

## 2023-02-15 NOTE — PLAN OF CARE
Goal Outcome Evaluation:           Progress: no change     Pt is alert, unable to obtain level of orientation. Pt makes eye contact, follows some commands, but speech is very hard to understand. Pt is very aphasic. Pt continues to have weakness to the right side, but does have some movement in his right upper ext. No change in skin integrity. CT angio of chest today completed. Family has been at bedside most of the day and has spoken with the neurologist at bedside. Pt remains strict NPO. Pt remains incontinent of b/b. No acute distress this shift. No injuries

## 2023-02-15 NOTE — THERAPY PROGRESS REPORT/RE-CERT
Acute Care - Physical Therapy Treatment Note  [facility]     Patient Name: SARAH Jones  : 1935  MRN: [id number]  Today's Date: 2/15/2023      Visit Dx:     ICD-10-CM ICD-9-CM   1. Cerebrovascular accident (CVA), unspecified mechanism (HCC)  I63.9 434.91   2. Altered mental status, unspecified altered mental status type  R41.82 780.97   3. Dysphagia, unspecified type  R13.10 787.20   4. Impaired mobility  Z74.09 799.89   5. Impaired mobility and ADLs  Z74.09 V49.89    Z78.9    6. 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)  I69.320 438.11     Patient Active Problem List   Diagnosis   • Cerebrovascular accident (CVA), unspecified mechanism (HCC)   • S/P hip hemiarthroplasty on 2023   • Essential hypertension   • Oropharyngeal dysphagia   • Hypernatremia   • Acute right heart failure (HCC)   • Bilateral pulmonary embolism with right heart strain (HCC)     Past Medical History:   Diagnosis Date   • Hypertension      Past Surgical History:   Procedure Laterality Date   • ORIF FEMUR FRACTURE       PT Assessment (last 12 hours)     PT Evaluation and Treatment     Row Name 02/15/23 1300 02/15/23 0954       Physical Therapy Time and Intention    Subjective Information --  Aphasia mixed  -AB complains of;pain  -AB    Document Type therapy note (daily note)  -AB therapy note (daily note)  -AB    Mode of Treatment physical therapy  -AB physical therapy  -AB    Comment Doesn't follow all commands and slow to process  -AB Try to sit up this PM, fatigued from bath  -AB    Row Name 02/15/23 1300 02/15/23 0954       General Information    Existing Precautions/Restrictions right;hip, posterior;fall  right side weakness and neglect  -AB right;hip, posterior;fall  -AB    Row Name 02/15/23 1300          Bed Mobility    Bed Mobility supine-sit;sit-supine  -AB     Supine-Sit [place] (Bed Mobility) verbal cues;moderate assist (50% patient effort);1 person assist  -AB     Assistive Device (Bed Mobility) head of bed  elevated  -AB     Comment, (Bed Mobility) needed assist with RLE and to pull up in sitting.  -AB     Row Name 02/15/23 1300          Transfers    Transfers sit-stand transfer;stand-sit transfer  -AB     Row Name 02/15/23 1300          Sit-Stand Transfer    Sit-Stand [place] (Transfers) verbal cues;contact guard;2 person assist  -AB     Assistive Device (Sit-Stand Transfers) walker, front-wheeled  -AB     Row Name 02/15/23 1300          Stand-Sit Transfer    Stand-Sit [place] (Transfers) verbal cues;contact guard;minimum assist (75% patient effort);2 person assist  -AB     Assistive Device (Stand-Sit Transfers) walker, front-wheeled  -AB     Row Name 02/15/23 1300          Gait/Stairs (Locomotion)    [place] Level (Gait) verbal cues;contact guard  -AB     Assistive Device (Gait) walker, front-wheeled  -AB     Distance in Feet (Gait) 35' mutliple times, standing rest breaks for posture and safety corrections  -AB     Row Name 02/15/23 1300 02/15/23 0954       Motor Skills    Comments, Therapeutic Exercise R LE AA-PROM and L LE AROM-AAROM  -AB RLE AA-PROM and LLE AA-AROM supine 20 reps  -AB    Additional Documentation -- Comments, Therapeutic Exercise (Row)  -AB    Row Name             Wound 02/13/23 1554 Bilateral gluteal Pressure Injury    Wound - Properties Group Placement Date: 02/13/23  -MB Placement Time: 1554  -MB Present on Hospital Admission: N  -MB Side: Bilateral  -MB Location: gluteal  -MB Primary Wound Type: Pressure inj  -MB    Retired Wound - Properties Group Placement Date: 02/13/23  -MB Placement Time: 1554  -MB Present on Hospital Admission: N  -MB Side: Bilateral  -MB Location: gluteal  -MB Primary Wound Type: Pressure inj  -MB    Retired Wound - Properties Group Date first assessed: 02/13/23  -MB Time first assessed: 1554  -MB Present on Hospital Admission: N  -MB Side: Bilateral  -MB Location: gluteal  -MB Primary Wound Type: Pressure inj  -MB    Row Name             Wound 02/13/23  1556 Right anterior greater trochanter Incision    Wound - Properties Group Placement Date: 02/13/23  -MB Placement Time: 1556  -MB Present on Hospital Admission: Y  -MB Side: Right  -MB Orientation: anterior  -MB Location: greater trochanter  -MB Primary Wound Type: Incision  -MB    Retired Wound - Properties Group Placement Date: 02/13/23  -MB Placement Time: 1556  -MB Present on Hospital Admission: Y  -MB Side: Right  -MB Orientation: anterior  -MB Location: greater trochanter  -MB Primary Wound Type: Incision  -MB    Retired Wound - Properties Group Date first assessed: 02/13/23  -MB Time first assessed: 1556  -MB Present on Hospital Admission: Y  -MB Side: Right  -MB Location: greater trochanter  -MB Primary Wound Type: Incision  -MB    Row Name 02/15/23 1300          Plan of Care Review    Plan of Care Reviewed With patient;family  -AB     Progress improving  -AB     Outcome Evaluation PT treatment complete. Mod assist to get to EOB.  CGA for sit to stand and CGA for ambulation with Rwx 35' multi times with standing rest to correct posture and safety. Left up with OT.  -AB     Row Name 02/15/23 0954          Positioning and Restraints    Pre-Treatment Position in bed  -AB     Post Treatment Position bed  -AB     In Bed fowlers;call light within reach  -AB           User Key  (r) = Recorded By, (t) = Taken By, (c) = Cosigned By    Initials Name Provider Type    AB Yi Alonso, PTA Physical Therapist Assistant    Raisa Hammond, RN Registered Nurse               02/15/23 1300   Physical Therapy Goals   Gait Training Goal Selection (PT) gait training, PT goal 1   Bed Mobility Goal 1 (PT)   Activity/Assistive Device (Bed Mobility Goal 1, PT) bed mobility activities, all   [place] Level/Cues Needed (Bed Mobility Goal 1, PT) supervision required   Time Frame (Bed Mobility Goal 1, PT) long term goal (LTG);by discharge   Progress/Outcomes (Bed Mobility Goal 1, PT) goal revised this date   Transfer Goal 1  (PT)   Activity/Assistive Device (Transfer Goal 1, PT) sit-to-stand/stand-to-sit;bed-to-chair/chair-to-bed   Hughes Level/Cues Needed (Transfer Goal 1, PT) contact guard required   Time Frame (Transfer Goal 1, PT) long term goal (LTG);by discharge   Progress/Outcome (Transfer Goal 1, PT) goal revised this date   Gait Training Goal 1 (PT)   Time Frame (Gait Training Goal 1, PT) long term goal (LTG);by discharge   Hughes Level (Gait Training Goal 1, PT) minimum assist (75% or more patient effort);tactile cues required;verbal cues required   Activity/Assistive Device (Gait Training Goal 1, PT) gait (walking locomotion);assistive device use;decrease fall risk;diminish gait deviation;forward stepping;walker, rolling   Progress/Outcome (Gait Training Goal 1, PT) new goal   Distance (Gait Training Goal 1, PT) 50ft   ROM Goal 1 (PT)   ROM Goal 1 (PT) pt will demonstrates full active ROM of all extremities   Time Frame (ROM Goal 1, PT) long-term goal (LTG);by discharge   Progress/Outcome (ROM Goal 1, PT) good progress toward goal       Physical Therapy Education     Title: PT OT SLP Therapies (In Progress)     Topic: Physical Therapy (In Progress)     Point: Mobility training (In Progress)     Learning Progress Summary           Patient Acceptance, E, NL by MS at 2/14/2023 9982    Comment: role of PT in his care                   Point: Home exercise program (Not Started)     Learner Progress:  Not documented in this visit.          Point: Body mechanics (Not Started)     Learner Progress:  Not documented in this visit.          Point: Precautions (Not Started)     Learner Progress:  Not documented in this visit.                      User Key     Initials Effective Dates Name Provider Type Discipline    MS 06/19/18 -  Sonya Huston, PT, DPT, NCS Physical Therapist PT              PT Recommendation and Plan     Plan of Care Reviewed With: patient, family  Progress: improving  Outcome Evaluation: PT treatment  complete. Mod assist to get to EOB.  CGA for sit to stand and CGA for ambulation with Rwx 35' multi times with standing rest to correct posture and safety. Left up with OT.       Time Calculation:    PT Charges     Row Name 02/15/23 1300 02/15/23 0954          Time Calculation    Start Time 1300  -AB 0954  -AB     Stop Time 1348  -AB 1018  -AB     Time Calculation (min) 48 min  -AB 24 min  -AB     PT Received On 02/15/23  -AB 02/15/23  -AB        Time Calculation- PT    Total Timed Code Minutes- PT 48 minute(s)  -AB 24 minute(s)  -AB           User Key  (r) = Recorded By, (t) = Taken By, (c) = Cosigned By    Initials Name Provider Type    AB Yi Alonso PTA Physical Therapist Assistant              Therapy Charges for Today     Code Description Service Date Service Provider Modifiers Qty    21832099324 HC PT THER PROC EA 15 MIN 2/15/2023 Yi Alonso, PTA GP 2    24165726764 HC GAIT TRAINING EA 15 MIN 2/15/2023 Yi Alonso, PTA GP 2    41516009023 HC PT THER PROC EA 15 MIN 2/15/2023 Yi Alonso, PTA GP 1          PT G-Codes  Outcome Measure Options: AM-PAC 6 Clicks Basic Mobility (PT), Modified Juan  AM-PAC 6 Clicks Score (PT): 7  AM-PAC 6 Clicks Score (OT): 6  Modified Young Scale: 5 - Severe disability.  Bedridden, incontinent, and requiring constant nursing care and attention.    Yi Alonso PTA  2/15/2023       Pt made significant progress today so goals where updated to reflect changes. I spoke with the patient's daughter about inpt acute rehab placement and she was agreeable.  Sonya Huston, PT, DPT, NCS 2/15/2023 14:23 CST

## 2023-02-15 NOTE — SIGNIFICANT NOTE
Patient initially showed some improvement in his right arm weakness this morning and also had some improvement in his receptive aphasia and was obeying commands.  He did have a CTA of the chest that showed bilateral pulmonary embolism but anticoagulation could not be initiated due to possible hemorrhagic conversion from his stroke.  Repeat CT scan of the brain today showed patchy hemorrhagic straining with vasogenic edema in the region of the left MCA infarction and a 10 mm intraparenchymal hematoma in the insular cortex of the left temporal lobe.      However patient had a rapid response event around 2 PM today characterized by sudden worsening left-sided weakness and syncope with shortness of breath.  Patient rapidly progressed to having agonal breathing.  Patient's symptoms were suspicious for worsening hemorrhagic conversion from his stroke. Patient's wife and daughter were at bedside and were updated about his sudden worsening condition and poor prognosis for meaningful recovery.  Patient's wife did state that patient would not want prolonged treatment with his current condition with limited chance for meaningful recovery.  She did agree to change his CODE STATUS to comfort measures only and pursue end-of-life care.    Plan  Discontinue all active treatments  Start comfort focused care  IV morphine as needed for pain or dyspnea  IV Ativan as needed for anxiety or agitation  Scopolamine patch for excessive secretions  Oxygen by nasal cannula 2 L/min for comfort only.  Do not titrate for O2 sats  CODE STATUS has been changed to comfort measures only    Life expectancy few hours to days.

## 2023-02-15 NOTE — THERAPY EVALUATION
Acute Care - Speech Language Pathology Initial Evaluation  [facility]     Patient Name: SARAH Jones  : 1935  MRN: [id number]  Today's Date: 2/15/2023               Admit Date: 2023     Patient seen for a communication evaluation. Patient denied pain. Patient able to respond to ST simple commands with 45% with max verbal and physical cues. Patient attempted to verbalized often, however unintelligible for the most part. Patient did make 2 statements with intelligible speech on this date. Groping and exaggerated movements noted. Patient able to respond yes or no with head shake or nod with 60% accuracy. Patient given a communication board for yes/no. Patient used for simple responses with 100% accuracy. Patient given picture communication board and alphabet board. Patient unable to use pictures or letters.Patient presents with a severe nonfluent aphasia. Patient is improved from his initial evaluation. ST will continue to follow.       Visit Dx:    ICD-10-CM ICD-9-CM   1. Cerebrovascular accident (CVA), unspecified mechanism (HCC)  I63.9 434.91   2. Altered mental status, unspecified altered mental status type  R41.82 780.97   3. Dysphagia, unspecified type  R13.10 787.20   4. Impaired mobility  Z74.09 799.89   5. Impaired mobility and ADLs  Z74.09 V49.89    Z78.9    6. 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)  I69.320 438.11     Patient Active Problem List   Diagnosis   • Cerebrovascular accident (CVA), unspecified mechanism (HCC)   • S/P hip hemiarthroplasty on 2023   • Essential hypertension   • Oropharyngeal dysphagia   • Hypernatremia   • Acute right heart failure (HCC)   • Bilateral pulmonary embolism with right heart strain (HCC)     Past Medical History:   Diagnosis Date   • Hypertension      Past Surgical History:   Procedure Laterality Date   • ORIF FEMUR FRACTURE         SLP Recommendation and Plan  SLP Diagnosis: severe, nonfluent, aphasia (02/15/23 0902)  SLP Diagnosis  Comments: see note (02/15/23 0902)        SLC Criteria for Skilled Therapy Interventions Met: yes (02/15/23 0902)  Anticipated Discharge Disposition (SLP): skilled nursing facility (02/15/23 0902)     Therapy Frequency (Swallow): at least, 3 days per week (02/15/23 0836)  Predicted Duration Therapy Intervention (Days): until discharge (02/15/23 0902)     Daily Summary of Progress (SLP): progress toward functional goals is good (02/15/23 0836)           Treatment Assessment (SLP): improved, oral dysphagia, pharyngeal dysphagia (02/15/23 0836)  Treatment Assessment Comments (SLP): see note (02/15/23 0836)  Plan for Continued Treatment (SLP): continue treatment per plan of care (02/15/23 0836)  Plan of Care Reviewed With: patient, caregiver, daughter (02/15/23 1246)  Progress: improving (02/15/23 1246)      SLP EVALUATION (last 72 hours)     SLP SLC Evaluation     Row Name 02/15/23 0902                   Communication Assessment/Intervention    Document Type evaluation  -MD        Subjective Information no complaints  -MD        Patient Observations alert  -MD        Patient/Family/Caregiver Comments/Observations daughter present  -MD        Patient Effort adequate  -MD        Symptoms Noted During/After Treatment none  -MD           General Information    Patient Profile Reviewed yes  -MD        Pertinent History Of Current Problem Patient admitted with CVA. Patient history includes right hip fracture with surgical repair and HTN.  -MD        Precautions/Limitations, Vision difficult to assess  -MD        Precautions/Limitations, Hearing difficult to assess  -MD        Prior Level of Function-Communication WFL  -MD        Plans/Goals Discussed with family  -MD        Barriers to Rehab cognitive status;medically complex  -MD           Pain Scale: FACES Pre/Post-Treatment    Pain: FACES Scale, Pretreatment 0-->no hurt  -MD        Posttreatment Pain Rating 0-->no hurt  -MD           Oral Motor Structure and Function     Dentition Assessment natural, present and adequate  -MD        Mucosal Quality dry  -MD           Oral Musculature and Cranial Nerve Assessment    Oral Motor General Assessment unable to assess;other (see comments)  Patient following commands inconsistent for oral mech exam.  -MD           Motor Speech Assessment/Intervention    Motor Speech Function severe impairment  -MD        Automatic Speech (Communication) response to greeting;severe impairment  -MD        Verbal Repetition (Communication) severe impairment;unable/difficult to assess  -MD        Phase Completion severe impairment;unable/difficult to assess  -MD        Conversational Speech (Communication) simple;severe impairment  -MD           Augmentative/Alternative Communication    Gestures (Alternative Communication) head nod;head shake  -MD        Gestures, Comment using to respond to yes and no questions  -MD        Alphabet Board (Alternative Communication unable to use  -MD        Communication Board (Alternative Communication) other (see comments)  Patient is able to use yes/no board.  -MD           Cognitive Assessment Intervention- SLP    Cognitive Function (Cognition) unable/difficult to assess  -MD           SLP Evaluation Clinical Impressions    SLP Diagnosis severe;nonfluent;aphasia  -MD        SLP Diagnosis Comments see note  -MD        Rehab Potential/Prognosis good  -MD        SLC Criteria for Skilled Therapy Interventions Met yes  -MD        Functional Impact functional impact in social situations;functional impact in ADLs;unable to make medical decisions;unable to care for self;needs 24 hour supervision;difficulty communicating wants, needs;difficulty communicating in an emergency;difficulty in expressing complex messages;restrictions in personal and social life;decreased ability to respond to situations safely  -MD           Recommendations    Therapy Frequency (SLP SLC) at least;3 days per week  -MD        Predicted Duration Therapy  Intervention (Days) until discharge  -MD        Anticipated Discharge Disposition (SLP) skilled nursing facility  -MD           Communication Treatment Objective and Progress Goals (SLP)    SLC LTGs Patient will demonstrate functional communication skills for return to discharge environment  -MD        Auditory Comprehension Treatment Objectives Auditory Comprehension Treatment Objectives (Group)  -MD        Verbal Expression Treatment Objectives Verbal Expression Treatment Objectives (Group)  -MD        Augmentative/Alternative Communication Objectives Augmentative/Alternative Communication Objectives (Group)  -MD           Patient will demonstrate functional communication skills for return to discharge environment     [place] with moderate cues  -MD        Time frame by discharge  -MD        Barriers medically complex;cognitive status  -MD        Progress/Outcomes new goal  -MD           Auditory Comprehension Treatment Objectives    Words/Phrases/Sentences Selection words/phrases/sentences, SLP goal 1  -MD        Follow Directions Selection follow directions, SLP goal 1  -MD           Words/Phrases/Sentences Goal 1 (SLP)    Improve Ability to Comprehend Words/Phrases/Sentences Through: Goal 1 (SLP) identify body part;identify familiar objects;80%;with moderate cues (50-74%)  -MD        Time Frame (Identify Objects and Pictures Goal 1, SLP) by discharge  -MD        Barriers (Identify Objects and Pictures Goal 1, SLP) medically complex  -MD        Progress/Outcomes (Identify Objects and Pictures Goal 1, SLP) new goal  -MD           Follow Directions Goal 2 (SLP)    Improve Ability to Follow Directions Goal 1 (SLP) 1 step direction with objects;with moderate cues (50-74%)  -MD        Time Frame (Follow Directions Goal 1, SLP) by discharge  -MD        Barriers (Improve Ability to Follow Directions Goal 1, SLP) medically complex  -MD        Progress/Outcomes (Follow Directions Goal 1, SLP) new goal  -MD            Verbal Expression Treatment Objectives    Word Retrieval Skills Selection word retrieval, SLP goal 1  -MD           Word Retrieval Skills Goal 1 (SLP)    Improve Word Retrieval Skills By Goal 1 (SLP) completing automatic speech task, counting;completing automatic speech task, alphabet;completing automatic speech task, days of the week;completing automatic speech task, months  -MD        Time Frame (Word Retrieval Goal 1, SLP) by discharge  -MD        Barriers (Word Retrieval Goal 1, SLP) medically complex  -MD        Progress (Word Retrieval Skills Goal 1, SLP) 50%;with moderate cues (50-74%)  -MD        Progress/Outcomes (Word Retrieval Goal 1, SLP) new goal  -MD              User Key  (r) = Recorded By, (t) = Taken By, (c) = Cosigned By    Initials Name Effective Dates    Clotilde Duque, SLP 06/21/22 -                    EDUCATION  The patient has been educated in the following areas:     Communication Impairment.           SLP GOALS     Row Name 02/15/23 0902 02/15/23 0836 02/13/23 1232       (LTG) Patient will demonstrate functional swallow for    Diet Texture (Demonstrate functional swallow) -- mechanical ground textures  -MD mechanical ground textures  -MD    Liquid viscosity (Demonstrate functional swallow) -- nectar/ mildly thick liquids  -MD nectar/ mildly thick liquids  -MD    [place] (Demonstrate functional swallow) -- with minimal cues (75-90% accuracy)  -MD with minimal cues (75-90% accuracy)  -MD    Time Frame (Demonstrate functional swallow) -- by discharge  -MD by discharge  -MD    Barriers (Demonstrate functional swallow) -- medically complex  -MD medically complex  -MD    Progress/Outcomes (Demonstrate functional swallow) -- good progress toward goal  -MD new goal  -MD       (STG) Patient will tolerate trials of    Consistencies Trialed (Tolerate trials) -- pureed textures;honey/ moderately thick liquids  -MD pureed textures;honey/ moderately thick liquids  -MD    Desired Outcome (Tolerate  trials) -- without signs/symptoms of aspiration;with adequate oral prep/transit/clearance  -MD without signs/symptoms of aspiration;with adequate oral prep/transit/clearance  -MD    [place] (Tolerate trials) -- with minimal cues (75-90% accuracy)  -MD with minimal cues (75-90% accuracy)  -MD    Time Frame (Tolerate trials) -- by discharge  -MD by discharge  -MD    Progress/Outcomes (Tolerate trials) -- good progress toward goal  -MD new goal  -MD       Patient will demonstrate functional communication skills for return to discharge environment     [place] with moderate cues  -MD -- --    Time frame by discharge  -MD -- --    Barriers medically complex;cognitive status  -MD -- --    Progress/Outcomes new goal  -MD -- --       Words/Phrases/Sentences Goal 1 (SLP)    Improve Ability to Comprehend Words/Phrases/Sentences Through: Goal 1 (SLP) identify body part;identify familiar objects;80%;with moderate cues (50-74%)  -MD -- --    Time Frame (Identify Objects and Pictures Goal 1, SLP) by discharge  -MD -- --    Barriers (Identify Objects and Pictures Goal 1, SLP) medically complex  -MD -- --    Progress/Outcomes (Identify Objects and Pictures Goal 1, SLP) new goal  -MD -- --       Follow Directions Goal 2 (SLP)    Improve Ability to Follow Directions Goal 1 (SLP) 1 step direction with objects;with moderate cues (50-74%)  -MD -- --    Time Frame (Follow Directions Goal 1, SLP) by discharge  -MD -- --    Barriers (Improve Ability to Follow Directions Goal 1, SLP) medically complex  -MD -- --    Progress/Outcomes (Follow Directions Goal 1, SLP) new goal  -MD -- --       Word Retrieval Skills Goal 1 (SLP)    Improve Word Retrieval Skills By Goal 1 (SLP) completing automatic speech task, counting;completing automatic speech task, alphabet;completing automatic speech task, days of the week;completing automatic speech task, months  -MD -- --    Time Frame (Word Retrieval Goal 1, SLP) by discharge  -MD -- --     Barriers (Word Retrieval Goal 1, SLP) medically complex  -MD -- --    Progress (Word Retrieval Skills Goal 1, SLP) 50%;with moderate cues (50-74%)  -MD -- --    Progress/Outcomes (Word Retrieval Goal 1, SLP) new goal  -MD -- --          User Key  (r) = Recorded By, (t) = Taken By, (c) = Cosigned By    Initials Name Provider Type    Clotilde Duque, SLP Speech and Language Pathologist                        Time Calculation:      Time Calculation- SLP     Row Name 02/15/23 1252 02/15/23 0935          Time Calculation- SLP    SLP Start Time 0935  -MD 0836  -MD     SLP Stop Time 1008  -MD 0935  -MD     SLP Time Calculation (min) 33 min  -MD 59 min  -MD     SLP Received On 02/15/23  -MD 02/15/23  -MD     SLP Goal Re-Cert Due Date 02/25/23  -MD --        Untimed Charges    27327-UX Eval Speech and Production w/ Language Minutes 33  -MD --     64927-YO Treatment Swallow Minutes -- 59  -MD        Total Minutes    Untimed Charges Total Minutes 33  -MD 59  -MD      Total Minutes 33  -MD 59  -MD           User Key  (r) = Recorded By, (t) = Taken By, (c) = Cosigned By    Initials Name Provider Type    Clotilde Duque, SLP Speech and Language Pathologist                Therapy Charges for Today     Code Description Service Date Service Provider Modifiers Qty    02800613754 HC ST TREATMENT SWALLOW 4 2/15/2023 Clotilde Salgado, SLP GN 1    [id number] HC ST EVAL SPEECH AND PROD W LANG  2 2/15/2023 Clotilde Salgado, SLP GN 1                     Clotilde Salgado, SLP  2/15/2023

## 2023-02-15 NOTE — PLAN OF CARE
Goal Outcome Evaluation:  Plan of Care Reviewed With: patient         Pt is alert, unable to obtain level of orientation. Pt makes eye contact, follows some commands, but speech is still very hard to understand. Pt remains strict NPO. Large firm area palpated on lower abdomen during initial assessment. Bladder scan done with >999 noted. In and out cath performed with 1500ml out. Re-checked bladder with scanner throughout the night. In and out cath a second time with 700ml out at 0400. Small amount of urine noted in external purewick suction. Turned q2 but patient repositions self into supine position easily. No acute distress this shift. Bed alarm on and safety checks performed.

## 2023-02-15 NOTE — THERAPY TREATMENT NOTE
Acute Care - Speech Language Pathology   Swallow Treatment Note [facility]     Patient Name: SARAH Jones  : 1935  MRN: [id number]  Today's Date: 2/15/2023               Admit Date: 2023     Patient seen to address dysphagia. Patient denied pain. Patient daughter present. Patient improved with following simple verbal commands with demonstration. Patient completing functional tasks with increased use of the right hand and visual scanning. Patient appears to comprehend some simple conversations. Patient is expressing yes and no with occasional spontaneous phrases. Patient will complete a communication eval on this date. Patient was given puree food and honey thick liquids from a straw and spoon. Patient displayed slow oral control with slow swallow response timing. Patient consumption was effective with extended time, displaying no overt s/s aspiration at bedside. Patient displayed groping and decreased oral control. Therefore no advanced textures were given. Patient is safe to start a puree diet with honey thick liquids from a spoon only with staff or family feeding. Patient swallow strategies reviewed with staff, patient, and family with expressed understanding stated. ST will proceed with communication eval later on this date.     Visit Dx:     ICD-10-CM ICD-9-CM   1. Cerebrovascular accident (CVA), unspecified mechanism (HCC)  I63.9 434.91   2. Altered mental status, unspecified altered mental status type  R41.82 780.97   3. Dysphagia, unspecified type  R13.10 787.20   4. Impaired mobility  Z74.09 799.89   5. Impaired mobility and ADLs  Z74.09 V49.89    Z78.9      Patient Active Problem List   Diagnosis   • Cerebrovascular accident (CVA), unspecified mechanism (HCC)   • S/P hip hemiarthroplasty on 2023   • Essential hypertension   • Oropharyngeal dysphagia   • Hypernatremia   • Acute right heart failure (HCC)     Past Medical History:   Diagnosis Date   • Hypertension      Past Surgical History:    Procedure Laterality Date   • ORIF FEMUR FRACTURE         SLP Recommendation and Plan     SLP Diet Recommendation: puree, honey thick liquids, other (see comments) (by spoon only) (02/15/23 0836)  Recommended Precautions and Strategies: upright posture during/after eating, small bites of food and sips of liquid, alternate between small bites of food and sips of liquid, general aspiration precautions, 1:1 supervision, assist with feeding (02/15/23 0836)  SLP Rec. for Method of Medication Administration: meds crushed, with puree (02/15/23 0836)     Monitor for Signs of Aspiration: yes, notify SLP if any concerns (02/15/23 0836)  Recommended Diagnostics: reassess via clinical swallow evaluation, SLE/Cog/Motor Speech Evaluation, other (see comments) (communication evaluation) (02/15/23 0836)           Therapy Frequency (Swallow): at least, 3 days per week (02/15/23 0836)  Predicted Duration Therapy Intervention (Days): until discharge (02/15/23 0836)        Daily Summary of Progress (SLP): progress toward functional goals is good (02/15/23 0836)               Treatment Assessment (SLP): improved, oral dysphagia, pharyngeal dysphagia (02/15/23 0836)  Treatment Assessment Comments (SLP): see note (02/15/23 0836)  Plan for Continued Treatment (SLP): continue treatment per plan of care (02/15/23 0836)         Plan of Care Reviewed With: patient, caregiver, daughter  Progress: improving      SWALLOW EVALUATION (last 72 hours)     SLP Adult Swallow Evaluation     Row Name 02/15/23 0836 02/13/23 1232                Rehab Evaluation    Document Type therapy note (daily note)  -MD evaluation  -MD       Subjective Information no complaints  -MD no complaints  -MD       Patient Observations alert  -MD alert  -MD       Patient/Family/Caregiver Comments/Observations daughter present  -MD wife present  -MD       Patient Effort adequate  -MD poor  -MD       Comment -- unable to follow commands or participate with functional tasks   -MD       Symptoms Noted During/After Treatment none  -MD none  -MD          General Information    Patient Profile Reviewed -- yes  -MD       Pertinent History Of Current Problem -- Patient admitted with CVA. Patient history includes recent right hip fracture with surgical repair and HTN.  -MD       Current Method of Nutrition -- NPO  -MD       Precautions/Limitations, Vision -- difficult to assess  -MD       Precautions/Limitations, Hearing -- difficult to assess  -MD       Prior Level of Function-Communication -- unknown  -MD       Prior Level of Function-Swallowing -- no diet consistency restrictions  -MD       Plans/Goals Discussed with -- spouse/S.O.  -MD       Barriers to Rehab -- medically complex  -MD          Pain    Additional Documentation -- Pain Scale: FACES Pre/Post-Treatment (Group)  -MD          Pain Scale: FACES Pre/Post-Treatment    Pain: FACES Scale, Pretreatment 0-->no hurt  -MD 0-->no hurt  -MD       Posttreatment Pain Rating 0-->no hurt  -MD 0-->no hurt  -MD          Oral Motor Structure and Function    Dentition Assessment -- --  unable to fully assess  -MD       Secretion Management -- problems swallowing secretions  -MD       Mucosal Quality -- dry  -MD       Volitional Swallow -- unable to elicit  -MD       Volitional Cough -- unable to elicit  -MD          Oral Musculature and Cranial Nerve Assessment    Oral Motor General Assessment -- unable to assess  -MD       Lingual Impairment, Detail. Cranial Nerves IX, XII (Glossopharyngeal and Hypoglossal) -- reduced lingual ROM;reduced strength left;other (see comments)  noted deviation at rest  -MD          General Eating/Swallowing Observations    Respiratory Support Currently in Use -- nasal cannula  -MD       Eating/Swallowing Skills -- fed by SLP  -MD       Positioning During Eating -- upright in bed  -MD       Utensils Used -- spoon  -MD       Consistencies Trialed -- ice chips  -MD       Pre SpO2 (%) -- 96  -MD       Post SpO2 (%) -- 97   -MD          Clinical Swallow Eval    Oral Prep Phase -- impaired  -MD       Oral Transit -- impaired  -MD       Oral Residue -- impaired  -MD       Pharyngeal Phase -- suspected pharyngeal impairment  -MD       Clinical Swallow Evaluation Summary -- see note  -MD          Oral Prep Concerns    Oral Prep Concerns -- bolus removed from mouth manually  no manipulation  -MD       Bolus Removed from Mouth Manually -- other (see comments)  ice chips  -MD          Oral Transit Concerns    Oral Transit Concerns -- unable to initiate oral transit  -MD          Oral Residue Concerns    Oral Residue Concerns -- other (see comments)  removed from the mouth  -MD          Pharyngeal Phase Concerns    Pharyngeal Phase Concerns -- other (see comments)  no swallow elicited  -MD          SLP Evaluation Clinical Impression    SLP Swallowing Diagnosis -- severe;oral dysphagia;suspected pharyngeal dysphagia  -MD       Functional Impact -- risk of aspiration/pneumonia;risk of malnutrition;risk of dehydration  -MD       Rehab Potential/Prognosis, Swallowing -- re-evaluate goals as necessary  -MD       Swallow Criteria for Skilled Therapeutic Interventions Met -- demonstrates skilled criteria  -MD          SLP Treatment Clinical Impressions    Treatment Assessment (SLP) improved;oral dysphagia;pharyngeal dysphagia  -MD --       Treatment Assessment Comments (SLP) see note  -MD --       Daily Summary of Progress (SLP) progress toward functional goals is good  -MD --       Barriers to Overall Progress (SLP) Medically complex  -MD --       Plan for Continued Treatment (SLP) continue treatment per plan of care  -MD --       Care Plan Review risks/benefits reviewed;current/potential barriers reviewed;patient/other agree to care plan  -MD --       Care Plan Review, Other Participant(s) daughter  -MD --          Recommendations    Therapy Frequency (Swallow) at least;3 days per week  -MD at least;3 days per week  -MD       Predicted Duration  Therapy Intervention (Days) until discharge  -MD until discharge  -MD       SLP Diet Recommendation puree;honey thick liquids;other (see comments)  by spoon only  -MD NPO  -MD       Recommended Diagnostics reassess via clinical swallow evaluation;SLE/Cog/Motor Speech Evaluation;other (see comments)  communication evaluation  -MD reassess via clinical swallow evaluation;SLE/Cog/Motor Speech Evaluation  -MD       Recommended Precautions and Strategies upright posture during/after eating;small bites of food and sips of liquid;alternate between small bites of food and sips of liquid;general aspiration precautions;1:1 supervision;assist with feeding  -MD other (see comments)  suctioning oral care every 4 hours  -MD       Oral Care Recommendations Oral Care before breakfast, after meals and PRN  -MD --       SLP Rec. for Method of Medication Administration meds crushed;with puree  -MD meds via alternate route  -MD       Monitor for Signs of Aspiration yes;notify SLP if any concerns  -MD yes;notify SLP if any concerns  -MD          Swallow Goals (SLP)    Swallow LTGs Patient will demonstrate functional swallow for  -MD Patient will demonstrate functional swallow for  -MD       Swallow STGs diet tolerance goal selection (SLP)  -MD diet tolerance goal selection (SLP)  -MD       Diet Tolerance Goal Selection (SLP) Patient will tolerate trials of  -MD Patient will tolerate trials of  -MD          (LTG) Patient will demonstrate functional swallow for    Diet Texture (Demonstrate functional swallow) mechanical ground textures  -MD mechanical ground textures  -MD       Liquid viscosity (Demonstrate functional swallow) nectar/ mildly thick liquids  -MD nectar/ mildly thick liquids  -MD       [place] (Demonstrate functional swallow) with minimal cues (75-90% accuracy)  -MD with minimal cues (75-90% accuracy)  -MD       Time Frame (Demonstrate functional swallow) by discharge  -MD by discharge  -MD       Barriers (Demonstrate  functional swallow) medically complex  -MD medically complex  -MD       Progress/Outcomes (Demonstrate functional swallow) good progress toward goal  -MD new goal  -MD          (STG) Patient will tolerate trials of    Consistencies Trialed (Tolerate trials) pureed textures;honey/ moderately thick liquids  -MD pureed textures;honey/ moderately thick liquids  -MD       Desired Outcome (Tolerate trials) without signs/symptoms of aspiration;with adequate oral prep/transit/clearance  -MD without signs/symptoms of aspiration;with adequate oral prep/transit/clearance  -MD       [place] (Tolerate trials) with minimal cues (75-90% accuracy)  -MD with minimal cues (75-90% accuracy)  -MD       Time Frame (Tolerate trials) by discharge  -MD by discharge  -MD       Progress/Outcomes (Tolerate trials) good progress toward goal  -MD new goal  -MD             User Key  (r) = Recorded By, (t) = Taken By, (c) = Cosigned By    Initials Name Effective Dates    Clotilde Duque, SLP 06/21/22 -                 EDUCATION  The patient has been educated in the following areas:   Dysphagia (Swallowing Impairment).        SLP GOALS     Row Name 02/15/23 0836 02/13/23 1232          (LTG) Patient will demonstrate functional swallow for    Diet Texture (Demonstrate functional swallow) mechanical ground textures  -MD mechanical ground textures  -MD     Liquid viscosity (Demonstrate functional swallow) nectar/ mildly thick liquids  -MD nectar/ mildly thick liquids  -MD     [place] (Demonstrate functional swallow) with minimal cues (75-90% accuracy)  -MD with minimal cues (75-90% accuracy)  -MD     Time Frame (Demonstrate functional swallow) by discharge  -MD by discharge  -MD     Barriers (Demonstrate functional swallow) medically complex  -MD medically complex  -MD     Progress/Outcomes (Demonstrate functional swallow) good progress toward goal  -MD new goal  -MD        (STG) Patient will tolerate trials of    Consistencies Trialed  (Tolerate trials) pureed textures;honey/ moderately thick liquids  -MD pureed textures;honey/ moderately thick liquids  -MD     Desired Outcome (Tolerate trials) without signs/symptoms of aspiration;with adequate oral prep/transit/clearance  -MD without signs/symptoms of aspiration;with adequate oral prep/transit/clearance  -MD     [place] (Tolerate trials) with minimal cues (75-90% accuracy)  -MD with minimal cues (75-90% accuracy)  -MD     Time Frame (Tolerate trials) by discharge  -MD by discharge  -MD     Progress/Outcomes (Tolerate trials) good progress toward goal  -MD new goal  -MD           User Key  (r) = Recorded By, (t) = Taken By, (c) = Cosigned By    Initials Name Provider Type    Clotilde Duque, SLP Speech and Language Pathologist                   Time Calculation:    Time Calculation- SLP     Row Name 02/15/23 0935             Time Calculation- SLP    SLP Start Time 0836  -MD      SLP Stop Time 0935  -MD      SLP Time Calculation (min) 59 min  -MD      SLP Received On 02/15/23  -MD         Untimed Charges    09699-IE Treatment Swallow Minutes 59  -MD         Total Minutes    Untimed Charges Total Minutes 59  -MD       Total Minutes 59  -MD            User Key  (r) = Recorded By, (t) = Taken By, (c) = Cosigned By    Initials Name Provider Type    Clotilde Duque, SLP Speech and Language Pathologist                Therapy Charges for Today     Code Description Service Date Service Provider Modifiers Qty    [id number]  ST TREATMENT SWALLOW 4 2/15/2023 Clotilde Salgado, SLP GN 1               Clotilde Salgado SLP  2/15/2023

## 2023-02-15 NOTE — THERAPY DISCHARGE NOTE
Acute Care - Speech Language Pathology Initial Evaluation/Discharge  [facility]     Patient Name: SARAH Jones  : 1935  MRN: [id number]  Today's Date: 2/15/2023               Admit Date: 2023     Patient initially showed signs of improvement on this date with tolerating puree food and honey thick liquids. Patient communicated with yes and no responses. Patient produced a few intelligible phrases to make wants and needs known. Patient was found to have sudden worsening of SOB and agonal breathing. A rapid response was called. Family made patient comfort measures. Patient .  Clotilde Salgado, SLP 2/15/2023 15:07 CST       Visit Dx:    ICD-10-CM ICD-9-CM   1. Cerebrovascular accident (CVA), unspecified mechanism (HCC)  I63.9 434.91   2. Altered mental status, unspecified altered mental status type  R41.82 780.97   3. Dysphagia, unspecified type  R13.10 787.20   4. Impaired mobility  Z74.09 799.89   5. Impaired mobility and ADLs  Z74.09 V49.89    Z78.9    6. 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)  I69.320 438.11     Patient Active Problem List   Diagnosis   • Cerebrovascular accident (CVA), unspecified mechanism (HCC)   • S/P hip hemiarthroplasty on 2023   • Essential hypertension   • Oropharyngeal dysphagia   • Hypernatremia   • Acute right heart failure (HCC)   • Bilateral pulmonary embolism with right heart strain (HCC)     Past Medical History:   Diagnosis Date   • Hypertension      Past Surgical History:   Procedure Laterality Date   • ORIF FEMUR FRACTURE         SLP Recommendation and Plan  SLP Diagnosis: severe, nonfluent, aphasia (02/15/23 0902)  SLP Diagnosis Comments: see note (02/15/23 0902)     SLC Criteria for Skilled Therapy Interventions Met: yes (02/15/23 0902)  Anticipated Discharge Disposition (SLP): other (see comments) (patient ) (02/15/23 1500)  Therapy Frequency (Swallow): at least, 3 days per week (02/15/23 0836)  Predicted Duration Therapy Intervention  (Days): until discharge (02/15/23 09)  Daily Summary of Progress (SLP): progress toward functional goals is good (02/15/23 08)              Reason for Discharge:  (patient ) (02/15/23 1500)     Treatment Assessment (SLP): improved, oral dysphagia, pharyngeal dysphagia (02/15/23 08)  Treatment Assessment Comments (SLP): see note (02/15/23 0836)  Plan for Continued Treatment (SLP): continue treatment per plan of care (02/15/23 0836)    Plan of Care Reviewed With: patient, caregiver, daughter (02/15/23 1246)  Progress: improving (02/15/23 1246)    SLP EVALUATION (last 72 hours)     SLP SLC Evaluation     Row Name 02/15/23 1400 02/15/23 0902                Communication Assessment/Intervention    Document Type -- evaluation  -MD       Subjective Information -- no complaints  -MD       Patient Observations -- alert  -MD       Patient/Family/Caregiver Comments/Observations -- daughter present  -MD       Patient Effort -- adequate  -MD       Symptoms Noted During/After Treatment -- none  -MD          General Information    Patient Profile Reviewed -- yes  -MD       Pertinent History Of Current Problem -- Patient admitted with CVA. Patient history includes right hip fracture with surgical repair and HTN.  -MD       Precautions/Limitations, Vision -- difficult to assess  -MD       Precautions/Limitations, Hearing -- difficult to assess  -MD       Prior Level of Function-Communication -- WFL  -MD       Plans/Goals Discussed with -- family  -MD       Barriers to Rehab -- cognitive status;medically complex  -MD          Pain Scale: FACES Pre/Post-Treatment    Pain: FACES Scale, Pretreatment -- 0-->no hurt  -MD       Posttreatment Pain Rating -- 0-->no hurt  -MD          Oral Motor Structure and Function    Dentition Assessment -- natural, present and adequate  -MD       Mucosal Quality -- dry  -MD          Oral Musculature and Cranial Nerve Assessment    Oral Motor General Assessment -- unable to assess;other (see  comments)  Patient following commands inconsistent for oral [facility] exam.  -MD          Motor Speech Assessment/Intervention    Motor Speech Function -- severe impairment  -MD       Automatic Speech (Communication) -- response to greeting;severe impairment  -MD       Verbal Repetition (Communication) -- severe impairment;unable/difficult to assess  -MD       Phase Completion -- severe impairment;unable/difficult to assess  -MD       Conversational Speech (Communication) -- simple;severe impairment  -MD          Augmentative/Alternative Communication    Gestures (Alternative Communication) -- head nod;head shake  -MD       Gestures, Comment -- using to respond to yes and no questions  -MD       Alphabet Board (Alternative Communication -- unable to use  -MD       Communication Board (Alternative Communication) -- other (see comments)  Patient is able to use yes/no board.  -MD          Cognitive Assessment Intervention- SLP    Cognitive Function (Cognition) -- unable/difficult to assess  -MD          SLP Evaluation Clinical Impressions    SLP Diagnosis -- severe;nonfluent;aphasia  -MD       SLP Diagnosis Comments -- see note  -MD       Rehab Potential/Prognosis -- good  -MD       SLC Criteria for Skilled Therapy Interventions Met -- yes  -MD       Functional Impact -- functional impact in social situations;functional impact in ADLs;unable to make medical decisions;unable to care for self;needs 24 hour supervision;difficulty communicating wants, needs;difficulty communicating in an emergency;difficulty in expressing complex messages;restrictions in personal and social life;decreased ability to respond to situations safely  -MD          Recommendations    Therapy Frequency (SLP SLC) -- at least;3 days per week  -MD       Predicted Duration Therapy Intervention (Days) -- until discharge  -MD       Anticipated Discharge Disposition (SLP) -- skilled nursing facility  -MD          Communication Treatment Objective and Progress  Goals (SLP)    SLC LTGs -- Patient will demonstrate functional communication skills for return to discharge environment  -MD       Auditory Comprehension Treatment Objectives -- Auditory Comprehension Treatment Objectives (Group)  -MD       Verbal Expression Treatment Objectives -- Verbal Expression Treatment Objectives (Group)  -MD       Augmentative/Alternative Communication Objectives -- Augmentative/Alternative Communication Objectives (Group)  -MD          Patient will demonstrate functional communication skills for return to discharge environment     [place] with moderate cues  -MD with moderate cues  -MD       Time frame by discharge  -MD by discharge  -MD       Barriers medically complex;cognitive status  -MD medically complex;cognitive status  -MD       Progress/Outcomes goal not met;medical status inhibited participation  -MD new goal  -MD          Auditory Comprehension Treatment Objectives    Words/Phrases/Sentences Selection words/phrases/sentences, SLP goal 1  -MD words/phrases/sentences, SLP goal 1  -MD       Follow Directions Selection follow directions, SLP goal 1  -MD follow directions, SLP goal 1  -MD          Words/Phrases/Sentences Goal 1 (SLP)    Improve Ability to Comprehend Words/Phrases/Sentences Through: Goal 1 (SLP) identify body part;identify familiar objects;80%;with moderate cues (50-74%)  -MD identify body part;identify familiar objects;80%;with moderate cues (50-74%)  -MD       Time Frame (Identify Objects and Pictures Goal 1, SLP) by discharge  -MD by discharge  -MD       Barriers (Identify Objects and Pictures Goal 1, SLP) medically complex  -MD medically complex  -MD       Progress/Outcomes (Identify Objects and Pictures Goal 1, SLP) goal not met;medical status inhibiting progress  -MD new goal  -MD          Follow Directions Goal 2 (SLP)    Improve Ability to Follow Directions Goal 1 (SLP) 1 step direction with objects;with moderate cues (50-74%)  -MD 1 step direction with  objects;with moderate cues (50-74%)  -MD       Time Frame (Follow Directions Goal 1, SLP) by discharge  -MD by discharge  -MD       Barriers (Improve Ability to Follow Directions Goal 1, SLP) medically complex  -MD medically complex  -MD       Progress/Outcomes (Follow Directions Goal 1, SLP) goal not met;medical status inhibiting progress  -MD new goal  -MD          Verbal Expression Treatment Objectives    Word Retrieval Skills Selection word retrieval, SLP goal 1  -MD word retrieval, SLP goal 1  -MD          Word Retrieval Skills Goal 1 (SLP)    Improve Word Retrieval Skills By Goal 1 (SLP) completing automatic speech task, counting;completing automatic speech task, alphabet;completing automatic speech task, days of the week;completing automatic speech task, months  -MD completing automatic speech task, counting;completing automatic speech task, alphabet;completing automatic speech task, days of the week;completing automatic speech task, months  -MD       Time Frame (Word Retrieval Goal 1, SLP) by discharge  -MD by discharge  -MD       Barriers (Word Retrieval Goal 1, SLP) medically complex  -MD medically complex  -MD       Progress (Word Retrieval Skills Goal 1, SLP) 50%;with moderate cues (50-74%)  -MD 50%;with moderate cues (50-74%)  -MD       Progress/Outcomes (Word Retrieval Goal 1, SLP) goal not met;medical status inhibiting progress  -MD new goal  -MD          SLP Discharge Summary    Discharge Destination other (see comments)  patient   -MD --       Discharge Diagnostic Statement patient   -MD --       Progress Toward Achieving Short/long Term Goals goals not met within established timelines  -MD --       Reason for Discharge other (see comments)  patient   -MD --             User Key  (r) = Recorded By, (t) = Taken By, (c) = Cosigned By    Initials Name Effective Dates    Clotilde Duque, SLP 22 -                    EDUCATION  The patient has been educated in the following  areas:   Communication Impairment.           SLP GOALS     Row Name 02/15/23 1400 02/15/23 0902 02/15/23 0836       (LTG) Patient will demonstrate functional swallow for    Diet Texture (Demonstrate functional swallow) -- -- mechanical ground textures  -MD    Liquid viscosity (Demonstrate functional swallow) -- -- nectar/ mildly thick liquids  -MD    [place] (Demonstrate functional swallow) -- -- with minimal cues (75-90% accuracy)  -MD    Time Frame (Demonstrate functional swallow) -- -- by discharge  -MD    Barriers (Demonstrate functional swallow) -- -- medically complex  -MD    Progress/Outcomes (Demonstrate functional swallow) -- -- good progress toward goal  -MD       (STG) Patient will tolerate trials of    Consistencies Trialed (Tolerate trials) -- -- pureed textures;honey/ moderately thick liquids  -MD    Desired Outcome (Tolerate trials) -- -- without signs/symptoms of aspiration;with adequate oral prep/transit/clearance  -MD    [place] (Tolerate trials) -- -- with minimal cues (75-90% accuracy)  -MD    Time Frame (Tolerate trials) -- -- by discharge  -MD    Progress/Outcomes (Tolerate trials) -- -- good progress toward goal  -MD       Patient will demonstrate functional communication skills for return to discharge environment     [place] with moderate cues  -MD with moderate cues  -MD --    Time frame by discharge  -MD by discharge  -MD --    Barriers medically complex;cognitive status  -MD medically complex;cognitive status  -MD --    Progress/Outcomes goal not met;medical status inhibited participation  -MD new goal  -MD --       Words/Phrases/Sentences Goal 1 (SLP)    Improve Ability to Comprehend Words/Phrases/Sentences Through: Goal 1 (SLP) identify body part;identify familiar objects;80%;with moderate cues (50-74%)  -MD identify body part;identify familiar objects;80%;with moderate cues (50-74%)  -MD --    Time Frame (Identify Objects and Pictures Goal 1, SLP) by discharge  -MD by  discharge  -MD --    Barriers (Identify Objects and Pictures Goal 1, SLP) medically complex  -MD medically complex  -MD --    Progress/Outcomes (Identify Objects and Pictures Goal 1, SLP) goal not met;medical status inhibiting progress  -MD new goal  -MD --       Follow Directions Goal 2 (SLP)    Improve Ability to Follow Directions Goal 1 (SLP) 1 step direction with objects;with moderate cues (50-74%)  -MD 1 step direction with objects;with moderate cues (50-74%)  -MD --    Time Frame (Follow Directions Goal 1, SLP) by discharge  -MD by discharge  -MD --    Barriers (Improve Ability to Follow Directions Goal 1, SLP) medically complex  -MD medically complex  -MD --    Progress/Outcomes (Follow Directions Goal 1, SLP) goal not met;medical status inhibiting progress  -MD new goal  -MD --       Word Retrieval Skills Goal 1 (SLP)    Improve Word Retrieval Skills By Goal 1 (SLP) completing automatic speech task, counting;completing automatic speech task, alphabet;completing automatic speech task, days of the week;completing automatic speech task, months  -MD completing automatic speech task, counting;completing automatic speech task, alphabet;completing automatic speech task, days of the week;completing automatic speech task, months  -MD --    Time Frame (Word Retrieval Goal 1, SLP) by discharge  -MD by discharge  -MD --    Barriers (Word Retrieval Goal 1, SLP) medically complex  -MD medically complex  -MD --    Progress (Word Retrieval Skills Goal 1, SLP) 50%;with moderate cues (50-74%)  -MD 50%;with moderate cues (50-74%)  -MD --    Progress/Outcomes (Word Retrieval Goal 1, SLP) goal not met;medical status inhibiting progress  -MD new goal  -MD --    Row Name 02/13/23 1232             ([facility]) Patient will demonstrate functional swallow for    Diet Texture (Demonstrate functional swallow) mechanical ground textures  -MD      Liquid viscosity (Demonstrate functional swallow) nectar/ mildly thick liquids  -MD       [place] (Demonstrate functional swallow) with minimal cues (75-90% accuracy)  -MD      Time Frame (Demonstrate functional swallow) by discharge  -MD      Barriers (Demonstrate functional swallow) medically complex  -MD      Progress/Outcomes (Demonstrate functional swallow) new goal  -MD         (STG) Patient will tolerate trials of    Consistencies Trialed (Tolerate trials) pureed textures;honey/ moderately thick liquids  -MD      Desired Outcome (Tolerate trials) without signs/symptoms of aspiration;with adequate oral prep/transit/clearance  -MD      [place] (Tolerate trials) with minimal cues (75-90% accuracy)  -MD      Time Frame (Tolerate trials) by discharge  -MD      Progress/Outcomes (Tolerate trials) new goal  -MD            User Key  (r) = Recorded By, (t) = Taken By, (c) = Cosigned By    Initials Name Provider Type    Clotilde Duque, SLP Speech and Language Pathologist                    Time Calculation:    Time Calculation- SLP     Row Name 02/15/23 1252 02/15/23 0935          Time Calculation- SLP    SLP Start Time 0935  -MD 0836  -MD     SLP Stop Time 1008  -MD 0935  -MD     SLP Time Calculation (min) 33 min  -MD 59 min  -MD     SLP Received On 02/15/23  -MD 02/15/23  -MD     SLP Goal Re-Cert Due Date 02/25/23  -MD --        Untimed Charges    45355-QW Eval Speech and Production w/ Language Minutes 33  -MD --     53271-XB Treatment Swallow Minutes -- 59  -MD        Total Minutes    Untimed Charges Total Minutes 33  -MD 59  -MD      Total Minutes 33  -MD 59  -MD           User Key  (r) = Recorded By, (t) = Taken By, (c) = Cosigned By    Initials Name Provider Type    Clotilde Duque, SLP Speech and Language Pathologist                Therapy Charges for Today     Code Description Service Date Service Provider Modifiers Qty    21929383750 HC ST TREATMENT SWALLOW 4 2/15/2023 Clotilde Salgado, SLP GN 1    [id number] HC ST EVAL SPEECH AND PROD W LANG  2 2/15/2023 Clotilde Salgado, SLP GN 1                    SLP Discharge Summary  Anticipated Discharge Disposition (SLP): other (see comments) (patient )  Reason for Discharge:  (patient )  Progress Toward Achieving Short/long Term Goals: goals not met within established timelines  Discharge Destination:  (patient )    Clotilde Salgado, SLP  2/15/2023 and [facility] - Speech Language Pathology   Swallow Treatment Note/Discharge  [place]     Patient Name: SARAH Jones  : 1935  MRN: [id number]  Today's Date: 2/15/2023               Admit Date: 2023    Visit Dx:    ICD-10-CM ICD-9-CM   1. Cerebrovascular accident (CVA), unspecified mechanism (HCC)  I63.9 434.91   2. Altered mental status, unspecified altered mental status type  R41.82 780.97   3. Dysphagia, unspecified type  R13.10 787.20   4. Impaired mobility  Z74.09 799.89   5. Impaired mobility and ADLs  Z74.09 V49.89    Z78.9    6. 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)  I69.320 438.11     Patient Active Problem List   Diagnosis   • Cerebrovascular accident (CVA), unspecified mechanism (HCC)   • S/P hip hemiarthroplasty on 2023   • Essential hypertension   • Oropharyngeal dysphagia   • Hypernatremia   • Acute right heart failure (HCC)   • Bilateral pulmonary embolism with right heart strain (HCC)     Past Medical History:   Diagnosis Date   • Hypertension      Past Surgical History:   Procedure Laterality Date   • ORIF FEMUR FRACTURE         SLP Recommendation and Plan     SLP Diet Recommendation: puree, honey thick liquids, other (see comments) (by spoon only) (02/15/23 0836)     Monitor for Signs of Aspiration: yes, notify SLP if any concerns (02/15/23 0836)  Recommended Diagnostics: reassess via clinical swallow evaluation, SLE/Cog/Motor Speech Evaluation, other (see comments) (communication evaluation) (02/15/23 08)     Anticipated Discharge Disposition (SLP): other (see comments) (patient ) (02/15/23 1500)     Therapy Frequency (Swallow): at  least, 3 days per week (02/15/23 0836)  Predicted Duration Therapy Intervention (Days): until discharge (02/15/23 09)     Daily Summary of Progress (SLP): progress toward functional goals is good (02/15/23 0836)     Anticipated Discharge Disposition (SLP): other (see comments) (patient ) (02/15/23 1500)           Reason for Discharge:  (patient ) (02/15/23 1500)     Treatment Assessment (SLP): improved, oral dysphagia, pharyngeal dysphagia (02/15/23 0836)  Treatment Assessment Comments (SLP): see note (02/15/23 0836)  Plan for Continued Treatment (SLP): continue treatment per plan of care (02/15/23 0836)    Plan of Care Reviewed With: patient, caregiver, daughter (02/15/23 124)  Progress: improving (02/15/23 1246)    SWALLOW EVALUATION (last 72 hours)     SLP Adult Swallow Evaluation     Row Name 02/15/23 0836 02/13/23 1232                Rehab Evaluation    Document Type therapy note (daily note)  -MD evaluation  -MD       Subjective Information no complaints  -MD no complaints  -MD       Patient Observations alert  -MD alert  -MD       Patient/Family/Caregiver Comments/Observations daughter present  -MD wife present  -MD       Patient Effort adequate  -MD poor  -MD       Comment -- unable to follow commands or participate with functional tasks  -MD       Symptoms Noted During/After Treatment none  -MD none  -MD          General Information    Patient Profile Reviewed -- yes  -MD       Pertinent History Of Current Problem -- Patient admitted with CVA. Patient history includes recent right hip fracture with surgical repair and HTN.  -MD       Current Method of Nutrition -- NPO  -MD       Precautions/Limitations, Vision -- difficult to assess  -MD       Precautions/Limitations, Hearing -- difficult to assess  -MD       Prior Level of Function-Communication -- unknown  -MD       Prior Level of Function-Swallowing -- no diet consistency restrictions  -MD       Plans/Goals Discussed with -- spouse/S.O.   -MD       Barriers to Rehab -- medically complex  -MD          Pain    Additional Documentation -- Pain Scale: FACES Pre/Post-Treatment (Group)  -MD          Pain Scale: FACES Pre/Post-Treatment    Pain: FACES Scale, Pretreatment 0-->no hurt  -MD 0-->no hurt  -MD       Posttreatment Pain Rating 0-->no hurt  -MD 0-->no hurt  -MD          Oral Motor Structure and Function    Dentition Assessment -- --  unable to fully assess  -MD       Secretion Management -- problems swallowing secretions  -MD       Mucosal Quality -- dry  -MD       Volitional Swallow -- unable to elicit  -MD       Volitional Cough -- unable to elicit  -MD          Oral Musculature and Cranial Nerve Assessment    Oral Motor General Assessment -- unable to assess  -MD       Lingual Impairment, Detail. Cranial Nerves IX, XII (Glossopharyngeal and Hypoglossal) -- reduced lingual ROM;reduced strength left;other (see comments)  noted deviation at rest  -MD          General Eating/Swallowing Observations    Respiratory Support Currently in Use -- nasal cannula  -MD       Eating/Swallowing Skills -- fed by SLP  -MD       Positioning During Eating -- upright in bed  -MD       Utensils Used -- spoon  -MD       Consistencies Trialed -- ice chips  -MD       Pre SpO2 (%) -- 96  -MD       Post SpO2 (%) -- 97  -MD          Clinical Swallow Eval    Oral Prep Phase -- impaired  -MD       Oral Transit -- impaired  -MD       Oral Residue -- impaired  -MD       Pharyngeal Phase -- suspected pharyngeal impairment  -MD       Clinical Swallow Evaluation Summary -- see note  -MD          Oral Prep Concerns    Oral Prep Concerns -- bolus removed from mouth manually  no manipulation  -MD       Bolus Removed from Mouth Manually -- other (see comments)  ice chips  -MD          Oral Transit Concerns    Oral Transit Concerns -- unable to initiate oral transit  -MD          Oral Residue Concerns    Oral Residue Concerns -- other (see comments)  removed from the mouth  -MD           Pharyngeal Phase Concerns    Pharyngeal Phase Concerns -- other (see comments)  no swallow elicited  -MD          SLP Evaluation Clinical Impression    SLP Swallowing Diagnosis -- severe;oral dysphagia;suspected pharyngeal dysphagia  -MD       Functional Impact -- risk of aspiration/pneumonia;risk of malnutrition;risk of dehydration  -MD       Rehab Potential/Prognosis, Swallowing -- re-evaluate goals as necessary  -MD       Swallow Criteria for Skilled Therapeutic Interventions Met -- demonstrates skilled criteria  -MD          SLP Treatment Clinical Impressions    Treatment Assessment (SLP) improved;oral dysphagia;pharyngeal dysphagia  -MD --       Treatment Assessment Comments (SLP) see note  -MD --       Daily Summary of Progress (SLP) progress toward functional goals is good  -MD --       Barriers to Overall Progress (SLP) Medically complex  -MD --       Plan for Continued Treatment (SLP) continue treatment per plan of care  -MD --       Care Plan Review risks/benefits reviewed;current/potential barriers reviewed;patient/other agree to care plan  -MD --       Care Plan Review, Other Participant(s) daughter  -MD --          Recommendations    Therapy Frequency (Swallow) at least;3 days per week  -MD at least;3 days per week  -MD       Predicted Duration Therapy Intervention (Days) until discharge  -MD until discharge  -MD       SLP Diet Recommendation puree;honey thick liquids;other (see comments)  by spoon only  -MD NPO  -MD       Recommended Diagnostics reassess via clinical swallow evaluation;SLE/Cog/Motor Speech Evaluation;other (see comments)  communication evaluation  -MD reassess via clinical swallow evaluation;SLE/Cog/Motor Speech Evaluation  -MD       Recommended Precautions and Strategies upright posture during/after eating;small bites of food and sips of liquid;alternate between small bites of food and sips of liquid;general aspiration precautions;1:1 supervision;assist with feeding  -MD other (see  comments)  suctioning oral care every 4 hours  -MD       Oral Care Recommendations Oral Care before breakfast, after meals and PRN  -MD --       SLP Rec. for Method of Medication Administration meds crushed;with puree  -MD meds via alternate route  -MD       Monitor for Signs of Aspiration yes;notify SLP if any concerns  -MD yes;notify SLP if any concerns  -MD          Swallow Goals (SLP)    Swallow LTGs Patient will demonstrate functional swallow for  -MD Patient will demonstrate functional swallow for  -MD       Swallow STGs diet tolerance goal selection (SLP)  -MD diet tolerance goal selection (SLP)  -MD       Diet Tolerance Goal Selection (SLP) Patient will tolerate trials of  -MD Patient will tolerate trials of  -MD          (LTG) Patient will demonstrate functional swallow for    Diet Texture (Demonstrate functional swallow) mechanical ground textures  -MD mechanical ground textures  -MD       Liquid viscosity (Demonstrate functional swallow) nectar/ mildly thick liquids  -MD nectar/ mildly thick liquids  -MD       [place] (Demonstrate functional swallow) with minimal cues (75-90% accuracy)  -MD with minimal cues (75-90% accuracy)  -MD       Time Frame (Demonstrate functional swallow) by discharge  -MD by discharge  -MD       Barriers (Demonstrate functional swallow) medically complex  -MD medically complex  -MD       Progress/Outcomes (Demonstrate functional swallow) good progress toward goal  -MD new goal  -MD          (STG) Patient will tolerate trials of    Consistencies Trialed (Tolerate trials) pureed textures;honey/ moderately thick liquids  -MD pureed textures;honey/ moderately thick liquids  -MD       Desired Outcome (Tolerate trials) without signs/symptoms of aspiration;with adequate oral prep/transit/clearance  -MD without signs/symptoms of aspiration;with adequate oral prep/transit/clearance  -MD       [place] (Tolerate trials) with minimal cues (75-90% accuracy)  -MD with minimal cues  (75-90% accuracy)  -MD       Time Frame (Tolerate trials) by discharge  -MD by discharge  -MD       Progress/Outcomes (Tolerate trials) good progress toward goal  -MD new goal  -MD             User Key  (r) = Recorded By, (t) = Taken By, (c) = Cosigned By    Initials Name Effective Dates    MD Salgado Clotilde JAKOB, SLP 06/21/22 -                 EDUCATION  The patient has been educated in the following areas:   Dysphagia (Swallowing Impairment).         SLP GOALS     Row Name 02/15/23 1400 02/15/23 0902 02/15/23 0836       (LTG) Patient will demonstrate functional swallow for    Diet Texture (Demonstrate functional swallow) -- -- mechanical ground textures  -MD    Liquid viscosity (Demonstrate functional swallow) -- -- nectar/ mildly thick liquids  -MD    [place] (Demonstrate functional swallow) -- -- with minimal cues (75-90% accuracy)  -MD    Time Frame (Demonstrate functional swallow) -- -- by discharge  -MD    Barriers (Demonstrate functional swallow) -- -- medically complex  -MD    Progress/Outcomes (Demonstrate functional swallow) -- -- good progress toward goal  -MD       (STG) Patient will tolerate trials of    Consistencies Trialed (Tolerate trials) -- -- pureed textures;honey/ moderately thick liquids  -MD    Desired Outcome (Tolerate trials) -- -- without signs/symptoms of aspiration;with adequate oral prep/transit/clearance  -MD    [place] (Tolerate trials) -- -- with minimal cues (75-90% accuracy)  -MD    Time Frame (Tolerate trials) -- -- by discharge  -MD    Progress/Outcomes (Tolerate trials) -- -- good progress toward goal  -MD       Patient will demonstrate functional communication skills for return to discharge environment     [place] with moderate cues  -MD with moderate cues  -MD --    Time frame by discharge  -MD by discharge  -MD --    Barriers medically complex;cognitive status  -MD medically complex;cognitive status  -MD --    Progress/Outcomes goal not met;medical status inhibited  participation  -MD new goal  -MD --       Words/Phrases/Sentences Goal 1 (SLP)    Improve Ability to Comprehend Words/Phrases/Sentences Through: Goal 1 (SLP) identify body part;identify familiar objects;80%;with moderate cues (50-74%)  -MD identify body part;identify familiar objects;80%;with moderate cues (50-74%)  -MD --    Time Frame (Identify Objects and Pictures Goal 1, SLP) by discharge  -MD by discharge  -MD --    Barriers (Identify Objects and Pictures Goal 1, SLP) medically complex  -MD medically complex  -MD --    Progress/Outcomes (Identify Objects and Pictures Goal 1, SLP) goal not met;medical status inhibiting progress  -MD new goal  -MD --       Follow Directions Goal 2 (SLP)    Improve Ability to Follow Directions Goal 1 (SLP) 1 step direction with objects;with moderate cues (50-74%)  -MD 1 step direction with objects;with moderate cues (50-74%)  -MD --    Time Frame (Follow Directions Goal 1, SLP) by discharge  -MD by discharge  -MD --    Barriers (Improve Ability to Follow Directions Goal 1, SLP) medically complex  -MD medically complex  -MD --    Progress/Outcomes (Follow Directions Goal 1, SLP) goal not met;medical status inhibiting progress  -MD new goal  -MD --       Word Retrieval Skills Goal 1 (SLP)    Improve Word Retrieval Skills By Goal 1 (SLP) completing automatic speech task, counting;completing automatic speech task, alphabet;completing automatic speech task, days of the week;completing automatic speech task, months  -MD completing automatic speech task, counting;completing automatic speech task, alphabet;completing automatic speech task, days of the week;completing automatic speech task, months  -MD --    Time Frame (Word Retrieval Goal 1, SLP) by discharge  -MD by discharge  -MD --    Barriers (Word Retrieval Goal 1, SLP) medically complex  -MD medically complex  -MD --    Progress (Word Retrieval Skills Goal 1, SLP) 50%;with moderate cues (50-74%)  -MD 50%;with moderate cues (50-74%)   -MD --    Progress/Outcomes (Word Retrieval Goal 1, SLP) goal not met;medical status inhibiting progress  -MD new goal  -MD --    Row Name 02/13/23 1232             (LTG) Patient will demonstrate functional swallow for    Diet Texture (Demonstrate functional swallow) mechanical ground textures  -MD      Liquid viscosity (Demonstrate functional swallow) nectar/ mildly thick liquids  -MD      [place] (Demonstrate functional swallow) with minimal cues (75-90% accuracy)  -MD      Time Frame (Demonstrate functional swallow) by discharge  -MD      Barriers (Demonstrate functional swallow) medically complex  -MD      Progress/Outcomes (Demonstrate functional swallow) new goal  -MD         (STG) Patient will tolerate trials of    Consistencies Trialed (Tolerate trials) pureed textures;honey/ moderately thick liquids  -MD      Desired Outcome (Tolerate trials) without signs/symptoms of aspiration;with adequate oral prep/transit/clearance  -MD      [place] (Tolerate trials) with minimal cues (75-90% accuracy)  -MD      Time Frame (Tolerate trials) by discharge  -MD      Progress/Outcomes (Tolerate trials) new goal  -MD            User Key  (r) = Recorded By, (t) = Taken By, (c) = Cosigned By    Initials Name Provider Type    Clotilde Duque, SLP Speech and Language Pathologist                     Time Calculation:    Time Calculation- SLP     Row Name 02/15/23 1252 02/15/23 0935          Time Calculation- SLP    SLP Start Time 0935  -MD 0836  -MD     SLP Stop Time 1008  -MD 0935  -MD     SLP Time Calculation (min) 33 min  -MD 59 min  -MD     SLP Received On 02/15/23  -MD 02/15/23  -MD     SLP Goal Re-Cert Due Date 02/25/23  -MD --        Untimed Charges    [id number] Eval Speech and Production w/ Language Minutes 33  -MD --     [id number] Treatment Swallow Minutes -- 59  -MD        Total Minutes    Untimed Charges Total Minutes 33  -MD 59  -MD      Total Minutes 33  -MD 59  -MD           User Key  (r) = Recorded By,  (t) = Taken By, (c) = Cosigned By    Initials Name Provider Type    Clotilde Duque, SLP Speech and Language Pathologist                Therapy Charges for Today     Code Description Service Date Service Provider Modifiers Qty    [id number] HC ST TREATMENT SWALLOW 4 2/15/2023 Clotilde Salgado, SLP GN 1    [id number] HC ST EVAL SPEECH AND PROD W LANG  2 2/15/2023 Clotilde Salgado, SLP GN 1               SLP Discharge Summary  Anticipated Discharge Disposition (SLP): other (see comments) (patient )  Reason for Discharge:  (patient )  Progress Toward Achieving Short/long Term Goals: goals not met within established timelines  Discharge Destination:  (patient )    Clotilde Salgado, YUNIOR  2/15/2023

## 2023-02-15 NOTE — PROGRESS NOTES
LOS: 2 days   Patient Care Team:  Gopi Chino MD as PCP - General (Family Medicine)    Chief Complaint: Shortness of breath     Subjective    L NATI Jones is a 87 y.o. male who is being seen in follow-up.  Patient not able to communicate well  Denies any chest pain  Has some shortness of breath  CTA of the pulmonary arteries show bilateral pulmonary embolism  Echo showed right ventricular enlargement  Patient is receiving aspirin therapy  Has had a large stroke  Neurology is seeing patient  No new events or occurrences  Maintaining sinus rhythm  Hemodynamically stable  Has difficulty with speech    Telemetry: no malignant arrhythmia. No significant pauses.    Review of Systems   Constitutional: No chills   Has fatigue   No fever.   HENT: Negative.    Eyes: Negative.    Respiratory: Negative for cough,   No chest wall soreness,   Shortness of breath,   no wheezing, no stridor.    Cardiovascular: As above  Gastrointestinal: Negative for abdominal distention,  No abdominal pain,   No blood in stool,   No constipation,   No diarrhea,   No nausea   No vomiting.   Endocrine: Negative.    Genitourinary: Negative for difficulty urinating, dysuria, flank pain and hematuria.   Musculoskeletal: Negative.    Skin: Negative for rash and wound.   Allergic/Immunologic: Negative.    Neurological: Negative for dizziness, syncope, weakness,   No light-headedness  No  headaches.   Hematological: Does not bruise/bleed easily.   Psychiatric/Behavioral: Difficulty with speech    History:   Past Medical History:   Diagnosis Date   • Hypertension      Past Surgical History:   Procedure Laterality Date   • ORIF FEMUR FRACTURE       Social History     Socioeconomic History   • Marital status: Single   Tobacco Use   • Smoking status: Never     Passive exposure: Never   • Smokeless tobacco: Never   Substance and Sexual Activity   • Alcohol use: Never   • Drug use: Never   • Sexual activity: Defer     Family History   Problem Relation  Age of Onset   • No Known Problems Mother    • No Known Problems Father        Labs:  WBC WBC   Date Value Ref Range Status   02/15/2023 11.56 (H) 3.40 - 10.80 10*3/mm3 Final   02/14/2023 12.78 (H) 3.40 - 10.80 10*3/mm3 Final   02/13/2023 16.86 (H) 3.40 - 10.80 10*3/mm3 Final      HGB Hemoglobin   Date Value Ref Range Status   02/15/2023 13.2 13.0 - 17.7 g/dL Final   02/14/2023 13.6 13.0 - 17.7 g/dL Final   02/13/2023 14.8 13.0 - 17.7 g/dL Final      HCT Hematocrit   Date Value Ref Range Status   02/15/2023 39.7 37.5 - 51.0 % Final   02/14/2023 40.9 37.5 - 51.0 % Final   02/13/2023 44.9 37.5 - 51.0 % Final      Platelets Platelets   Date Value Ref Range Status   02/15/2023 126 (L) 140 - 450 10*3/mm3 Final   02/14/2023 123 (L) 140 - 450 10*3/mm3 Final   02/13/2023 163 140 - 450 10*3/mm3 Final      MCV MCV   Date Value Ref Range Status   02/15/2023 107.9 (H) 79.0 - 97.0 fL Final   02/14/2023 108.2 (H) 79.0 - 97.0 fL Final   02/13/2023 108.5 (H) 79.0 - 97.0 fL Final        Results from last 7 days   Lab Units 02/15/23  0637 02/14/23  0507 02/13/23  0902   SODIUM mmol/L 150* 149* 145   POTASSIUM mmol/L 3.0* 3.5 3.5   CHLORIDE mmol/L 112* 114* 107   CO2 mmol/L 24.0 21.0* 23.0   BUN mg/dL 25* 31* 32*   CREATININE mg/dL 0.88 1.00 1.06   CALCIUM mg/dL 8.4* 8.0* 8.5*   BILIRUBIN mg/dL 2.4* 2.1* 2.5*   ALK PHOS U/L 83 79 95   ALT (SGPT) U/L 6 5 5   AST (SGOT) U/L 28 20 20   GLUCOSE mg/dL 109* 136* 143*     Lab Results   Component Value Date    TROPONINT 74 (C) 02/13/2023     PT/INR:    Protime   Date Value Ref Range Status   02/13/2023 15.7 (H) 11.8 - 14.8 Seconds Final   /  INR   Date Value Ref Range Status   02/13/2023 1.24 (H) 0.91 - 1.09 Final       Imaging Results (Last 72 Hours)     Procedure Component Value Units Date/Time    CT Angiogram Chest [[id number]] Collected: 02/14/23 1648     Updated: 02/14/23 1707    Narrative:      EXAMINATION: CT ANGIOGRAM CHEST-      2/14/2023 4:23 PM CST     HISTORY: Pulmonary embolism  (PE) suspected, unknown D-dimer;  I63.9-Cerebral infarction, unspecified; R41.82-Altered mental status,  unspecified; R13.10-Dysphagia, unspecified; Z74.09-Other reduced  mobility; Z74.09-Other reduced mobility; Z78.9-Other specified health  status. Stroke. Decreased mobility with increased risk for DVT and PE.     In order to have a CT radiation dose as low as reasonably achievable  Automated Exposure Control was utilized for adjustment of the mA and/or  KV according to patient size.     DLP in mGycm= 299.     CT angiogram chest with and without IV contrast.     Large bilateral pulmonary emboli.  High clot burden.  Right heart strain with RV/LV ratio of 53/27.     Thoracic aortic calcification with no aneurysm.  No mediastinal mass.     Chronic lung changes with hyperexpansion.  Mild dependent atelectasis.     No pneumonia, pneumothorax, or pleural effusion.     Bilateral renal cysts with the largest on the right measuring up to 10  cm.     Summary:  1. Large bilateral pulmonary emboli.  2. High clot burden with right heart strain.  3. This report was called to Kate on 3A at 5:00 PM.                                   This report was finalized on 02/14/2023 17:04 by Dr. Pradeep Parra MD.    MRI Brain Without Contrast [[id number]] Collected: 02/13/23 1828     Updated: 02/13/23 1834    Narrative:      EXAMINATION: MRI BRAIN WO CONTRAST-     2/13/2023 5:38 PM CST     HISTORY: Neuro deficit, acute, stroke suspected; I63.9-Cerebral  infarction, unspecified; R41.82-Altered mental status, unspecified;  R13.10-Dysphagia, unspecified.     Noncontrast MR imaging of the brain.  Axial and sagittal sequences.     Large acute left MCA infarct involving an area measuring approximately 9  x 3 cm.     Small cortical infarct in the right frontal lobe involving an area  measuring approximately 2 x 1 cm.     No hemorrhage or mass effect.  No midline shift.     Ventricle size is normal.     The visualized paranasal sinuses are clear.      There is prominent diffuse atrophy and mild small vessel disease.     Summary:  1. Large left parieto-occipital and small right frontal acute infarct.  2. No hemorrhage or midline shift.              This report was finalized on 02/13/2023 18:31 by Dr. Pradeep Parra MD.    CT Angiogram Head w AI Analysis of LVO [[id number]] Collected: 02/13/23 1009     Updated: 02/13/23 1017    Narrative:      CT ANGIOGRAM HEAD W AI ANALYSIS OF LVO- 2/13/2023 9:04 AM CST     HISTORY: Neuro deficit, acute stroke suspected, right-sided weakness  with speech impediment     COMPARISON: Nonenhanced CT head exam 02/13/2023     DOSE LENGTH PRODUCT: 161 mGy cm. Automated exposure control was also  utilized to decrease patient radiation dose.     TECHNIQUE: Axial images of the brain are performed following IV  contrast. 2-D and maximal intensity projectional reconstructed images  are reviewed. Poor contrast bolus related to poor cardiac output. Mild  motion artifact. AI analysis of LVO was utilized.        FINDINGS:  Mild contrast enhancement of the distal internal carotid  arteries with mild enhancement of the anterior and middle cerebral  arteries. A discrete central thrombus is not localized within these  vessels. There is minimal if any contrast seen within the posterior  cerebral and small caliber basilar artery resulting in diminished  assessment. The RAPID analysis suggests decreased enhancement of the  left MCA branches.       Impression:      1. Suboptimal contrast enhancement related to poor cardiac output and a  summation artifact. Findings concerning for decreased flow to the  branches of the left MCA artery. Please refer to dictation above.  This report was finalized on 02/13/2023 10:14 by Dr. Alanna Rivera MD.    CT Angiogram Neck [[id number]] Collected: 02/13/23 1002     Updated: 02/13/23 1012    Narrative:      CT ANGIOGRAM NECK- 2/13/2023 9:04 AM CST     HISTORY: Neuro deficit, acute stroke suspected, right paralysis  with  speech impediment     COMPARISON: None     DOSE LENGTH PRODUCT: 160 mGy cm. Automated exposure control was also  utilized to decrease patient radiation dose.     TECHNIQUE: Axial images of the neck are performed following IV contrast.  2-D and maximal intensity projectional reconstructed images are  reviewed.     FINDINGS:  Contrast is present within the left subclavian,  brachiocephalic veins and SVC with mild contrast suspected within the  arch of the thoracic aorta. Findings likely represent sequelae of poor  cardiac output. Patient with persistent motion. Image quality degraded  for these reasons.     There is moderate calcification within the arch of the thoracic aorta.  No significant contrast present within the common, internal, and  external carotid arteries to assess severity of carotid artery stenosis.  There is moderate calcified plaque of the left carotid bulb extending  into the proximal left internal carotid artery which results in at least  50% stenosis of the carotid bulb. Only mild calcified plaque of the  right carotid bulb. Hypoplasia suspected of the left vertebral artery.  No significant vertebral artery contrast enhancement during the exam. No  convincing aneurysm localized.     No soft tissue mass or pathologic lymphadenopathy identified within the  neck.     Calcified granuloma the left lung apex. Moderate to advanced  degenerative change of the cervical spine.       Impression:      1. Imaging is degraded by the patient's poor cardiac output and timing  of contrast bolus as well as repetitive patient motion artifact. Poor  enhancement of the extracranial carotid and vertebral arteries during  image acquisition. Moderate calcified plaque of the left carotid bulb  resulting in at least 50% stenosis of the bulb. Only mild calcified  plaque of the right carotid bulb. Moderate atherosclerosis of the normal  caliber thoracic aorta.  This report was finalized on 02/13/2023 10:09 by   Alanna Rivera MD.    CT Head Without Contrast Stroke Protocol [[id number]] Collected: 02/13/23 0951     Updated: 02/13/23 1005    Narrative:      CT HEAD WO CONTRAST STROKE PROTOCOL- 2/13/2023 9:16 AM CST     HISTORY: Neuro deficit, acute, stroke suspected, right sided paralysis  and speech impediment     COMPARISON: None     DOSE LENGTH PRODUCT: 793 mGy cm. Automated exposure control was also  utilized to decrease patient radiation dose.     TECHNIQUE: Axial images the brain are obtained without contrast     FINDINGS:  There is questionable hyperdense left M2/3 cerebral artery  which may be seen with left MCA ischemia. Questionable loss of the  gray-white matter differentiation along the left insular cortex. There  is no intracranial hemorrhage. Old ischemic changes of the right frontal  as well as right parieto-occipital region. There are chronic small  vessel ischemic changes. No extra-axial hematoma. No mass effect at this  time. Mild generalized volume loss.     Visible paranasal sinuses and mastoid air cells are well-aerated.       Impression:      1. Findings concerning for early left MCA ischemia with a hyperdense  left MCA sign and loss of gray-white matter differentiation along the  left insular cortex. No intracranial hemorrhage or prominent mass  effect.  2. Old ischemic changes right cerebral hemisphere with chronic small  vessel ischemia.     Comments: Findings called to Dr. Bradford in the ER at 10:00 AM on  02/13/2023  This report was finalized on 02/13/2023 10:02 by Dr. Alanna Rivera MD.    XR Chest 1 View [[id number]] Collected: 02/13/23 0939     Updated: 02/13/23 0943    Narrative:      EXAMINATION: XR CHEST 1 VW- 2/13/2023 9:39 AM CST     HISTORY: Acute Stroke Protocol (Onset < 12 hrs).     REPORT: A frontal view of the chest was obtained.     COMPARISON: There are no correlative imaging studies for comparison.     The lungs are clear, mildly hypoexpanded. There is ectasia of the  thoracic aorta,  "exaggerated by mild rotation of the patient. Heart size  is normal. No pneumothorax or pleural effusion is identified. The  osseous structures and upper abdomen appear unremarkable.       Impression:      No acute cardiopulmonary abnormality.  This report was finalized on 02/13/2023 09:40 by Dr. Jose E Barreto MD.          Objective     Allergies   Allergen Reactions   • Penicillins Unknown - Low Severity       Medication Review: Performed  Current Facility-Administered Medications   Medication Dose Route Frequency Provider Last Rate Last Admin   • aspirin tablet 325 mg  325 mg Oral Daily Coni Moreno MD        Or   • aspirin suppository 300 mg  300 mg Rectal Daily Coni Moreno MD   300 mg at 02/14/23 1118   • atorvastatin (LIPITOR) tablet 80 mg  80 mg Oral Nightly Coni Moreno MD       • dextrose (D5W) 5 % infusion  100 mL/hr Intravenous Continuous Yola Marquez APRN 100 mL/hr at 02/15/23 0756 100 mL/hr at 02/15/23 0756   • ondansetron (ZOFRAN) injection 4 mg  4 mg Intravenous Q6H PRN Coni Moreno MD       • potassium chloride 10 mEq in 100 mL IVPB  10 mEq Intravenous Q1H PRN Yola Marquez APRN       • sodium chloride 0.9 % flush 10 mL  10 mL Intravenous PRN Coni Moreno MD       • sodium chloride 0.9 % flush 10 mL  10 mL Intravenous Q12H Coni Moreno MD   10 mL at 02/14/23 2045   • sodium chloride 0.9 % flush 10 mL  10 mL Intravenous PRN Coni Moreno MD       • sodium chloride 0.9 % infusion 40 mL  40 mL Intravenous PRN Coni Moreno MD           Vital Sign Min/Max for last 24 hours  Temp  Min: 97.7 °F (36.5 °C)  Max: 98.4 °F (36.9 °C)   BP  Min: 97/47  Max: 143/67   Pulse  Min: 53  Max: 68   Resp  Min: 18  Max: 20   SpO2  Min: 92 %  Max: 96 %   No data recorded   No data recorded     Flowsheet Rows    Flowsheet Row First Filed Value   Admission Height 185.4 cm (73\") Documented at 02/13/2023 1547   Admission Weight 81 kg (178 lb 9.6 oz) Documented at 02/13/2023 " 0917          Results for orders placed during the hospital encounter of 02/13/23    Adult Transthoracic Echo Complete W/ Cont if Necessary Per Protocol (With Agitated Saline)    Interpretation Summary  •  Left ventricular systolic function is normal. Left ventricular ejection fraction appears to be 66 - 70%.  •  The left ventricular cavity is small in size.  •  Severely reduced right ventricular systolic function noted.  •  The right ventricular cavity is severely dilated.  •  Saline test results are negative.  •  The right atrial cavity is severely  dilated.  •  Estimated right ventricular systolic pressure from tricuspid regurgitation is normal (<35 mmHg).      Physical Exam:    General Appearance: Awake, alert, in no acute distress  Eyes: Pupils equal and reactive    Ears: Appear intact with no abnormalities noted  Nose: Nares normal, no drainage  Neck: supple, trachea midline, no carotid bruit and no JVD  Back: no kyphosis present,    Lungs: respirations regular, respirations even and respirations unlabored  Heart: normal S1, S2, no significant murmurs   No gallops or rubs  no rub and no click  Abdomen: normal bowel sounds, no tenderness   Skin: no bleeding, bruising or rash  Extremities: no cyanosis  Psychiatric/Behavioral: Negative for agitation, behavioral problems, confusion, the patient does  appear to be nervous/anxious.       Results Review:   I reviewed the patient's new clinical results.  I reviewed the patient's new imaging results and agree with the interpretation.  I reviewed the patient's other test results and agree with the interpretation  I personally viewed and interpreted the patient's EKG/Telemetry data    Discussed with patient  Updated patient regarding any new or relevant abnormalities on review of records or any new findings on physical exam.   Mentioned to patient about purpose of visit and desirable health short and long term goals and objectives.     Reviewed available prior notes,  consults, prior visits, laboratory findings, radiology and cardiology relevant reports.   Updated chart as applicable.   I have reviewed the patient's medical history in detail and updated the computerized patient record as relevant.          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    Essential hypertension    Oropharyngeal dysphagia    Hypernatremia    Acute right heart failure (HCC)  Bilateral pulmonary embolism with right heart strain  Hypokalemia    Plan    Given recent large stroke doubt patient can tolerate either systemic anticoagulation or EKOS procedure  Continue on aspirin  Will await further recommendations from neurology  Monitor and treat hypokalemia    Telemetry  Deep vein thrombosis prophylaxis/precautions  Appropriate diet, fluid, sodium, caffeine, stimulants intake   Questions were encouraged, asked and answered to the patient's  understanding and satisfaction.  Compliance to diet and medications       Rob San MD  02/15/23  08:55 CST    EMR Dragon/Transcription was used to dictate part of this note

## 2023-02-15 NOTE — PLAN OF CARE
Goal Outcome Evaluation:  Plan of Care Reviewed With: patient, family        Progress: improving  Outcome Evaluation: PT treatment complete. Mod assist to get to EOB.  CGA for sit to stand and CGA for ambulation with Rwx 35' multi times with standing rest to correct posture and safety. Left up with OT.

## 2023-02-15 NOTE — DISCHARGE SUMMARY
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 Baird with neurology  Dr. San with cardiology.    Procedures Performed: none.    Pertinent Test Results:   Imaging Results (Last 72 Hours)     Procedure Component Value Units Date/Time    CT Head Without Contrast [[id number]] Collected: 02/15/23 1309     Updated: 02/15/23 1319    Narrative:      EXAMINATION: CT HEAD WO CONTRAST- 2/15/2023 1:09 PM CST     HISTORY: assess for hemorrhagic conversion of LEFT MCA CVA;  I63.9-Cerebral infarction, unspecified; R41.82-Altered mental status,  unspecified; R13.10-Dysphagia, unspecified; Z74.09-Other reduced  mobility; Z74.09-Other reduced mobility; Z78.9-Other specified health  status.     DOSE: 514 mGycm (Automatic exposure control technique was implemented in  an effort to keep the radiation dose as low as possible without  compromising image quality)     REPORT: Spiral CT of the head was performed without contrast,  reconstructed coronal and sagittal images are also reviewed.     COMPARISON: MRI brain without contrast 02/13/2020 3, CT head without  contrast 02/13/2023.     There is moderate edema and decreased attenuation in the distribution of  the left middle cerebral artery compatible with the known left MCA  infarction. There is patchy hemorrhagic staining within the  left  (temporoparietal region, with a small focal intraparenchymal hematoma in  the left upper lobe that measures approximately 10 mm. This is  immediately review to the dense MCA sign seen on the previous CT. There  is no significant midline shift. The ventricles and basal cisterns are  within normal limits. There are small chronic infarct in the right  posterior parietal occipital region. This is stable.       Impression:      Moderate increase in vasogenic edema in the region of the  known left MCA infarction, with associated patchy hemorrhagic staining  and a 10 mm relatively isodense intraparenchymal hematoma in the insular  cortex region of the left temporal lobe.     This report was finalized on 02/15/2023 13:16 by Dr. Jose E Barreto MD.    CT Angiogram Chest [[id number]] Collected: 02/14/23 1648     Updated: 02/14/23 1707    Narrative:      EXAMINATION: CT ANGIOGRAM CHEST-      2/14/2023 4:23 PM CST     HISTORY: Pulmonary embolism (PE) suspected, unknown D-dimer;  I63.9-Cerebral infarction, unspecified; R41.82-Altered mental status,  unspecified; R13.10-Dysphagia, unspecified; Z74.09-Other reduced  mobility; Z74.09-Other reduced mobility; Z78.9-Other specified health  status. Stroke. Decreased mobility with increased risk for DVT and PE.     In order to have a CT radiation dose as low as reasonably achievable  Automated Exposure Control was utilized for adjustment of the mA and/or  KV according to patient size.     DLP in mGycm= 299.     CT angiogram chest with and without IV contrast.     Large bilateral pulmonary emboli.  High clot burden.  Right heart strain with RV/LV ratio of 53/27.     Thoracic aortic calcification with no aneurysm.  No mediastinal mass.     Chronic lung changes with hyperexpansion.  Mild dependent atelectasis.     No pneumonia, pneumothorax, or pleural effusion.     Bilateral renal cysts with the largest on the right measuring up to 10  cm.     Summary:  1. Large bilateral pulmonary  emboli.  2. High clot burden with right heart strain.  3. This report was called to Kate on 3A at 5:00 PM.                                   This report was finalized on 02/14/2023 17:04 by Dr. Pradeep Parra MD.    MRI Brain Without Contrast [[id number]] Collected: 02/13/23 1828     Updated: 02/13/23 1834    Narrative:      EXAMINATION: MRI BRAIN WO CONTRAST-     2/13/2023 5:38 PM CST     HISTORY: Neuro deficit, acute, stroke suspected; I63.9-Cerebral  infarction, unspecified; R41.82-Altered mental status, unspecified;  R13.10-Dysphagia, unspecified.     Noncontrast MR imaging of the brain.  Axial and sagittal sequences.     Large acute left MCA infarct involving an area measuring approximately 9  x 3 cm.     Small cortical infarct in the right frontal lobe involving an area  measuring approximately 2 x 1 cm.     No hemorrhage or mass effect.  No midline shift.     Ventricle size is normal.     The visualized paranasal sinuses are clear.     There is prominent diffuse atrophy and mild small vessel disease.     Summary:  1. Large left parieto-occipital and small right frontal acute infarct.  2. No hemorrhage or midline shift.              This report was finalized on 02/13/2023 18:31 by Dr. Pradeep Parra MD.    CT Angiogram Head w AI Analysis of LVO [[id number]] Collected: 02/13/23 1009     Updated: 02/13/23 1017    Narrative:      CT ANGIOGRAM HEAD W AI ANALYSIS OF LVO- 2/13/2023 9:04 AM CST     HISTORY: Neuro deficit, acute stroke suspected, right-sided weakness  with speech impediment     COMPARISON: Nonenhanced CT head exam 02/13/2023     DOSE LENGTH PRODUCT: 161 mGy cm. Automated exposure control was also  utilized to decrease patient radiation dose.     TECHNIQUE: Axial images of the brain are performed following IV  contrast. 2-D and maximal intensity projectional reconstructed images  are reviewed. Poor contrast bolus related to poor cardiac output. Mild  motion artifact. AI analysis of LVO was utilized.         FINDINGS:  Mild contrast enhancement of the distal internal carotid  arteries with mild enhancement of the anterior and middle cerebral  arteries. A discrete central thrombus is not localized within these  vessels. There is minimal if any contrast seen within the posterior  cerebral and small caliber basilar artery resulting in diminished  assessment. The RAPID analysis suggests decreased enhancement of the  left MCA branches.       Impression:      1. Suboptimal contrast enhancement related to poor cardiac output and a  summation artifact. Findings concerning for decreased flow to the  branches of the left MCA artery. Please refer to dictation above.  This report was finalized on 02/13/2023 10:14 by Dr. Alanna Rivera MD.    CT Angiogram Neck [[id number]] Collected: 02/13/23 1002     Updated: 02/13/23 1012    Narrative:      CT ANGIOGRAM NECK- 2/13/2023 9:04 AM CST     HISTORY: Neuro deficit, acute stroke suspected, right paralysis with  speech impediment     COMPARISON: None     DOSE LENGTH PRODUCT: 160 mGy cm. Automated exposure control was also  utilized to decrease patient radiation dose.     TECHNIQUE: Axial images of the neck are performed following IV contrast.  2-D and maximal intensity projectional reconstructed images are  reviewed.     FINDINGS:  Contrast is present within the left subclavian,  brachiocephalic veins and SVC with mild contrast suspected within the  arch of the thoracic aorta. Findings likely represent sequelae of poor  cardiac output. Patient with persistent motion. Image quality degraded  for these reasons.     There is moderate calcification within the arch of the thoracic aorta.  No significant contrast present within the common, internal, and  external carotid arteries to assess severity of carotid artery stenosis.  There is moderate calcified plaque of the left carotid bulb extending  into the proximal left internal carotid artery which results in at least  50% stenosis of the  carotid bulb. Only mild calcified plaque of the  right carotid bulb. Hypoplasia suspected of the left vertebral artery.  No significant vertebral artery contrast enhancement during the exam. No  convincing aneurysm localized.     No soft tissue mass or pathologic lymphadenopathy identified within the  neck.     Calcified granuloma the left lung apex. Moderate to advanced  degenerative change of the cervical spine.       Impression:      1. Imaging is degraded by the patient's poor cardiac output and timing  of contrast bolus as well as repetitive patient motion artifact. Poor  enhancement of the extracranial carotid and vertebral arteries during  image acquisition. Moderate calcified plaque of the left carotid bulb  resulting in at least 50% stenosis of the bulb. Only mild calcified  plaque of the right carotid bulb. Moderate atherosclerosis of the normal  caliber thoracic aorta.  This report was finalized on 02/13/2023 10:09 by Dr. Alanna Rivera MD.    CT Head Without Contrast Stroke Protocol [[id number]] Collected: 02/13/23 0951     Updated: 02/13/23 1005    Narrative:      CT HEAD WO CONTRAST STROKE PROTOCOL- 2/13/2023 9:16 AM CST     HISTORY: Neuro deficit, acute, stroke suspected, right sided paralysis  and speech impediment     COMPARISON: None     DOSE LENGTH PRODUCT: 793 mGy cm. Automated exposure control was also  utilized to decrease patient radiation dose.     TECHNIQUE: Axial images the brain are obtained without contrast     FINDINGS:  There is questionable hyperdense left M2/3 cerebral artery  which may be seen with left MCA ischemia. Questionable loss of the  gray-white matter differentiation along the left insular cortex. There  is no intracranial hemorrhage. Old ischemic changes of the right frontal  as well as right parieto-occipital region. There are chronic small  vessel ischemic changes. No extra-axial hematoma. No mass effect at this  time. Mild generalized volume loss.     Visible paranasal  sinuses and mastoid air cells are well-aerated.       Impression:      1. Findings concerning for early left MCA ischemia with a hyperdense  left MCA sign and loss of gray-white matter differentiation along the  left insular cortex. No intracranial hemorrhage or prominent mass  effect.  2. Old ischemic changes right cerebral hemisphere with chronic small  vessel ischemia.     Comments: Findings called to Dr. Bradford in the ER at 10:00 AM on  02/13/2023  This report was finalized on 02/13/2023 10:02 by Dr. Alanna Rivera MD.    XR Chest 1 View [[id number]] Collected: 02/13/23 0939     Updated: 02/13/23 0943    Narrative:      EXAMINATION: XR CHEST 1 VW- 2/13/2023 9:39 AM CST     HISTORY: Acute Stroke Protocol (Onset < 12 hrs).     REPORT: A frontal view of the chest was obtained.     COMPARISON: There are no correlative imaging studies for comparison.     The lungs are clear, mildly hypoexpanded. There is ectasia of the  thoracic aorta, exaggerated by mild rotation of the patient. Heart size  is normal. No pneumothorax or pleural effusion is identified. The  osseous structures and upper abdomen appear unremarkable.       Impression:      No acute cardiopulmonary abnormality.  This report was finalized on 02/13/2023 09:40 by Dr. Jose E Barreto MD.        Lab Results (last 72 hours)     Procedure Component Value Units Date/Time    POC Glucose Once [[id number]]  (Normal) Collected: 02/15/23 1359    Specimen: Blood Updated: 02/15/23 1420     Glucose 112 mg/dL      Comment: : [id number] Usman MorganMeter ID: [id number]       CBC (No Diff) [[id number]]  (Abnormal) Collected: 02/15/23 0637    Specimen: Blood Updated: 02/15/23 0810     WBC 11.56 10*3/mm3      RBC 3.68 10*6/mm3      Hemoglobin 13.2 g/dL      Hematocrit 39.7 %      .9 fL      MCH 35.9 pg      MCHC 33.2 g/dL      RDW 14.0 %      RDW-SD 55.7 fl      MPV 10.7 fL      Platelets 126 10*3/mm3     Comprehensive Metabolic Panel [[id number]]  (Abnormal)  "Collected: 02/15/23 0637    Specimen: Blood Updated: 02/15/23 0742     Glucose 109 mg/dL      BUN 25 mg/dL      Creatinine 0.88 mg/dL      Sodium 150 mmol/L      Potassium 3.0 mmol/L      Chloride 112 mmol/L      CO2 24.0 mmol/L      Calcium 8.4 mg/dL      Total Protein 5.8 g/dL      Albumin 3.0 g/dL      ALT (SGPT) 6 U/L      AST (SGOT) 28 U/L      Alkaline Phosphatase 83 U/L      Total Bilirubin 2.4 mg/dL      Globulin 2.8 gm/dL      A/G Ratio 1.1 g/dL      BUN/Creatinine Ratio 28.4     Anion Gap 14.0 mmol/L      eGFR 84.3 mL/min/1.73     Narrative:      GFR Normal >60  Chronic Kidney Disease <60  Kidney Failure <15    The GFR formula is only valid for adults with stable renal function between ages 18 and 70.    D-dimer, Quantitative [[id number]]  (Abnormal) Collected: 02/14/23 1556    Specimen: Blood Updated: 02/14/23 1642     D-Dimer, Quantitative >20.00 MCGFEU/mL     Narrative:      According to the assay 's published package insert, a normal (<0.50 MCGFEU/mL) D-dimer result in conjunction with a non-high clinical probability assessment, excludes deep vein thrombosis (DVT) and pulmonary embolism (PE) with high sensitivity.    D-dimer values increase with age and this can make VTE exclusion of an older population difficult. To address this, the [employer], based on best available evidence and recent guidelines, recommends that clinicians use age-adjusted D-dimer thresholds in patients greater than 50 years of age with: a) a low probability of PE who do not meet all Pulmonary Embolism Rule Out Criteria, or b) in those with intermediate probability of PE.   The formula for an age-adjusted D-dimer cut-off is \"age/100\".  For example, a 60 year old patient would have an age-adjusted cut-off of 0.60 MCGFEU/mL and an 80 year old 0.80 MCGFEU/mL.    POC Glucose Once [[id number]]  (Normal) Collected: 02/14/23 0844    Specimen: Blood Updated: 02/14/23 0856     Glucose 112 mg/dL      Comment: " : [id number] Cornell Cannon ID: [id number]       Hemoglobin A1c [[id number]]  (Normal) Collected: 02/14/23 0507    Specimen: Blood Updated: 02/14/23 0554     Hemoglobin A1C 5.40 %     Narrative:      Hemoglobin A1C Ranges:    Increased Risk for Diabetes  5.7% to 6.4%  Diabetes                     >= 6.5%  Diabetic Goal                < 7.0%    Lipid Panel [[id number]]  (Abnormal) Collected: 02/14/23 0507    Specimen: Blood Updated: 02/14/23 0548     Total Cholesterol 95 mg/dL      Triglycerides 111 mg/dL      HDL Cholesterol 25 mg/dL      LDL Cholesterol  49 mg/dL      VLDL Cholesterol 21 mg/dL      LDL/HDL Ratio 1.91    Narrative:      Cholesterol Reference Ranges  (U.S. Department of Health and Human Services ATP III Classifications)    Desirable          <200 mg/dL  Borderline High    200-239 mg/dL  High Risk          >240 mg/dL      Triglyceride Reference Ranges  (U.S. Department of Health and Human Services ATP III Classifications)    Normal           <150 mg/dL  Borderline High  150-199 mg/dL  High             200-499 mg/dL  Very High        >500 mg/dL    HDL Reference Ranges  (U.S. Department of Health and Human Services ATP III Classifications)    Low     <40 mg/dl (major risk factor for CHD)  High    >60 mg/dl ('negative' risk factor for CHD)        LDL Reference Ranges  (U.S. Department of Health and Human Services ATP III Classifications)    Optimal          <100 mg/dL  Near Optimal     100-129 mg/dL  Borderline High  130-159 mg/dL  High             160-189 mg/dL  Very High        >189 mg/dL    Comprehensive Metabolic Panel [[id number]]  (Abnormal) Collected: 02/14/23 0507    Specimen: Blood Updated: 02/14/23 0546     Glucose 136 mg/dL      BUN 31 mg/dL      Creatinine 1.00 mg/dL      Sodium 149 mmol/L      Potassium 3.5 mmol/L      Comment: Slight hemolysis detected by analyzer. Results may be affected.        Chloride 114 mmol/L      CO2 21.0 mmol/L      Calcium 8.0 mg/dL      Total Protein 5.4 g/dL       Albumin 3.3 g/dL      ALT (SGPT) 5 U/L      AST (SGOT) 20 U/L      Alkaline Phosphatase 79 U/L      Total Bilirubin 2.1 mg/dL      Globulin 2.1 gm/dL      A/G Ratio 1.6 g/dL      BUN/Creatinine Ratio 31.0     Anion Gap 14.0 mmol/L      eGFR 73.8 mL/min/1.73     Narrative:      GFR Normal >60  Chronic Kidney Disease <60  Kidney Failure <15    The GFR formula is only valid for adults with stable renal function between ages 18 and 70.    CBC (No Diff) [[id number]]  (Abnormal) Collected: 02/14/23 0507    Specimen: Blood Updated: 02/14/23 0532     WBC 12.78 10*3/mm3      RBC 3.78 10*6/mm3      Hemoglobin 13.6 g/dL      Hematocrit 40.9 %      .2 fL      MCH 36.0 pg      MCHC 33.3 g/dL      RDW 14.1 %      RDW-SD 54.9 fl      MPV 10.5 fL      Platelets 123 10*3/mm3     POC Glucose Once [[id number]]  (Abnormal) Collected: 02/14/23 0425    Specimen: Blood Updated: 02/14/23 0436     Glucose 133 mg/dL      Comment: : [id number] Muniz JamesMeter ID: [id number]       POC Glucose Once [[id number]]  (Normal) Collected: 02/13/23 1822    Specimen: Blood Updated: 02/13/23 1832     Glucose 120 mg/dL      Comment: : [email or web address] Bright MeganMeter ID: [id number]       [place] Draw [[id number]] Collected: 02/13/23 0902    Specimen: Blood Updated: 02/13/23 1315    Narrative:      The following orders were created for panel order [place] Draw.  Procedure                               Abnormality         Status                     ---------                               -----------         ------                     Green Top (Gel)[[id number]]                                  Final result               Lavender Top[[id number]]                                                                Red Top[[id number]]                                          Final result               Gray Top[[id number]]                                         Final result               Light Blue Top[[id number]]                                                                 Please view results for these tests on the individual orders.    [place] Top [[id number]] Collected: 02/13/23 0902    Specimen: Blood Updated: 02/13/23 1315     Extra Tube Hold for add-ons.     Comment: Auto resulted.       High Sensitivity Troponin T 2Hr [[id number]]  (Abnormal) Collected: 02/13/23 1106    Specimen: Blood Updated: 02/13/23 1138     HS Troponin T 74 ng/L      Troponin T Delta 5 ng/L     Narrative:      High Sensitive Troponin T Reference Range:  <10.0 ng/L- Negative Female for AMI  <15.0 ng/L- Negative Male for AMI  >=10 - Abnormal Female indicating possible myocardial injury.  >=15 - Abnormal Male indicating possible myocardial injury.   Clinicians would have to utilize clinical acumen, EKG, Troponin, and serial changes to determine if it is an Acute Myocardial Infarction or myocardial injury due to an underlying chronic condition.         High Sensitivity Troponin T [[id number]]  (Abnormal) Collected: 02/13/23 0902    Specimen: Blood Updated: 02/13/23 1050     HS Troponin T 69 ng/L     Narrative:      High Sensitive Troponin T Reference Range:  <10.0 ng/L- Negative Female for AMI  <15.0 ng/L- Negative Male for AMI  >=10 - Abnormal Female indicating possible myocardial injury.  >=15 - Abnormal Male indicating possible myocardial injury.   Clinicians would have to utilize clinical acumen, EKG, Troponin, and serial changes to determine if it is an Acute Myocardial Infarction or myocardial injury due to an underlying chronic condition.         Green Top (Gel) [[id number]] Collected: 02/13/23 0902    Specimen: Blood Updated: 02/13/23 1045     Extra Tube Hold for add-ons.     Comment: Auto resulted.       POC Glucose Once [[id number]]  (Normal) Collected: 02/13/23 1018    Specimen: Blood Updated: 02/13/23 1031     Glucose 127 mg/dL      Comment: : rgramlis Gramlisch RobertMeter ID: [id number]       Red Top [[id number]] Collected: 02/13/23 0902    Specimen: Blood Updated: 02/13/23 1016      Extra Tube Hold for add-ons.     Comment: Auto resulted.       CBC & Differential [[id number]]  (Abnormal) Collected: 02/13/23 0902    Specimen: Blood Updated: 02/13/23 0950    Narrative:      The following orders were created for panel order CBC & Differential.  Procedure                               Abnormality         Status                     ---------                               -----------         ------                     CBC Auto Differential[[id number]]        Abnormal            Final result                 Please view results for these tests on the individual orders.    CBC Auto Differential [[id number]]  (Abnormal) Collected: 02/13/23 0902    Specimen: Blood Updated: 02/13/23 0950     WBC 16.86 10*3/mm3      RBC 4.14 10*6/mm3      Hemoglobin 14.8 g/dL      Hematocrit 44.9 %      .5 fL      MCH 35.7 pg      MCHC 33.0 g/dL      RDW 14.2 %      RDW-SD 55.8 fl      MPV 10.2 fL      Platelets 163 10*3/mm3     Manual Differential [[id number]]  (Abnormal) Collected: 02/13/23 0902    Specimen: Blood Updated: 02/13/23 0950     Neutrophil % 85.1 %      Lymphocyte % 0.0 %      Monocyte % 9.9 %      Eosinophil % 1.0 %      Bands %  1.0 %      Metamyelocyte % 2.0 %      Myelocyte % 1.0 %      Neutrophils Absolute 14.52 10*3/mm3      Lymphocytes Absolute 0.00 10*3/mm3      Monocytes Absolute 1.67 10*3/mm3      Eosinophils Absolute 0.17 10*3/mm3      Anisocytosis Slight/1+     Crenated RBC's Slight/1+     Macrocytes Slight/1+     WBC Morphology Normal     Platelet Morphology Normal    Comprehensive Metabolic Panel [[id number]]  (Abnormal) Collected: 02/13/23 0902    Specimen: Blood Updated: 02/13/23 0939     Glucose 143 mg/dL      BUN 32 mg/dL      Creatinine 1.06 mg/dL      Sodium 145 mmol/L      Potassium 3.5 mmol/L      Comment: Slight hemolysis detected by analyzer. Results may be affected.        Chloride 107 mmol/L      CO2 23.0 mmol/L      Calcium 8.5 mg/dL      Total Protein 6.3 g/dL      Albumin 3.6  g/dL      ALT (SGPT) 5 U/L      AST (SGOT) 20 U/L      Alkaline Phosphatase 95 U/L      Total Bilirubin 2.5 mg/dL      Globulin 2.7 gm/dL      A/G Ratio 1.3 g/dL      BUN/Creatinine Ratio 30.2     Anion Gap 15.0 mmol/L      eGFR 68.8 mL/min/1.73     Narrative:      GFR Normal >60  Chronic Kidney Disease <60  Kidney Failure <15    The GFR formula is only valid for adults with stable renal function between ages 18 and 70.    Protime-INR [[id number]]  (Abnormal) Collected: 02/13/23 0902    Specimen: Blood Updated: 02/13/23 0928     Protime 15.7 Seconds      INR 1.24        Hospital Course:  Mr. Jones is an 87-year-old male who presented to [facility] on 2/13 with left gaze preference and right hemiparesis with altered mentation.  He is a resident at the [facility] in [facility] for rehabilitation as he recently underwent right hip bipolar hemiarthroplasty on 2/1/2023 by Dr. Escalera.  No anticoagulation postop.  He was taking aspirin 81 mg daily.  He was reportedly doing well and was planned for discharge on Thursday, 2/16.  He arrived to our medical facility as a possible code stroke. A stat head CT without contrast showed hypoattenuation in the right frontal lobe.  The left MCA region may have some hypoattenuation in the gray-white matter border.  Not a candidate for tPA as he had a recent major hip surgery.  Chest x-ray stable. On room air, no chest pain, not tachycardic.  Vital signs stable.     Elevated high-sensitivity troponin 69 with follow-up 74.  Troponin T delta 5.  No chest pain.  Abnormal 2D echocardiogram and EKG.  Results for orders placed during the hospital encounter of 02/13/23     Adult Transthoracic Echo Complete W/ Cont if Necessary Per Protocol (With Agitated Saline)     Interpretation Summary  •  Left ventricular systolic function is normal. Left ventricular ejection fraction appears to be 66 - 70%.  •  The left ventricular cavity is small in size.  •  Severely reduced right  ventricular systolic function noted.  •  The right ventricular cavity is severely dilated.  •  Saline test results are negative.  •  The right atrial cavity is severely  dilated.  •  Estimated right ventricular systolic pressure from tricuspid regurgitation is normal (<35 mmHg).    Cardiology consulted.  Discussed with BETO Otero on  - CTA chest had not been obtained.  Recently underwent surgical intervention on  and was not on anticoagulation postoperatively per daughter.  He was taking aspirin 81 mg daily.  On room air, no chest pain, not tachycardic.  Vital signs stable.  D-dimer checked and greater than 20.  CTA chest showed large bilateral pulmonary emboli.  High clot burden with right heart strain.  Dr. San evaluated this, given large stroke not a candidate for EKOS procedure.      Dr. Baird with neurology evaluated patient.  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  No evidence of large vessel occlusion on CTA head neck.  He was receiving rectal aspirin.  Rizwan Mas PA-C with neurology wanted to hold off on anticoagulation until repeat CT head obtained to assure no hemorrhagic conversion.  Repeat head CT did show some hemorrhagic conversion.  Plans were to start heparin drip.  Patient was reportedly doing okay today.  Diet had been advanced per SLP.  He was up ambulating in the august with therapy when he returned to his room.  He had sudden worsening left-sided weakness, syncope and shortness of breath.  Patient rapidly progressed to having agonal breathing.  Rapid response team called.  CODE STATUS No CPR with limited support to include no intubation/cardioversion.  No intubation per daughter and wife. Dr. Moreno discussed goals of care with daughter Joy.  CODE STATUS changed to comfort measures.  Patient  at 1412.    Electronically signed by BETO Goldstein, 02/15/23, 14:51 CST.    Time: 35 minutes.

## 2023-02-15 NOTE — NURSING NOTE
This RN to patient room. Pt diaphoretic, pale, sweaty, mouth breathing in chair with therapy. Assisted back into bed x2. . Rapid response called. 02 applied 5L NC. 0s sats in the 70s. Dr. Moreno and Dr. Lynne at bedside. Pt family at bedside.

## 2023-02-15 NOTE — PLAN OF CARE
Goal Outcome Evaluation:  Plan of Care Reviewed With: patient, caregiver, daughter        Progress: improving     Patient seen for a communication evaluation. Patient denied pain. Patient able to respond to ST simple commands with 45% with max verbal and physical cues. Patient attempted to verbalized often, however unintelligible for the most part. Patient did make 2 statements with intelligible speech on this date. Groping and exaggerated movements noted. Patient able to respond yes or no with head shake or nod with 60% accuracy. Patient given a communication board for yes/no. Patient used for simple responses with 100% accuracy. Patient given picture communication board and alphabet board. Patient unable to use pictures or letters.Patient presents with a severe nonfluent aphasia. Patient is improved from his initial evaluation. ST will continue to follow.    Clotilde Salgado, SLP 2/15/2023 12:51 CST

## 2023-02-15 NOTE — THERAPY DISCHARGE NOTE
Acute Care - Occupational Therapy Treatment Note/Discharge  [facility]     Patient Name: SARAH Jones  : 1935  MRN: [id number]  Today's Date: 2/15/2023               Admit Date: 2023       ICD-10-CM ICD-9-CM   1. Cerebrovascular accident (CVA), unspecified mechanism (HCC)  I63.9 434.91   2. Altered mental status, unspecified altered mental status type  R41.82 780.97   3. Dysphagia, unspecified type  R13.10 787.20   4. Impaired mobility  Z74.09 799.89   5. Impaired mobility and ADLs  Z74.09 V49.89    Z78.9    6. 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)  I69.320 438.11     Patient Active Problem List   Diagnosis   • Cerebrovascular accident (CVA), unspecified mechanism (HCC)   • S/P hip hemiarthroplasty on 2023   • Essential hypertension   • Oropharyngeal dysphagia   • Hypernatremia   • Acute right heart failure (HCC)   • Bilateral pulmonary embolism with right heart strain (HCC)     Past Medical History:   Diagnosis Date   • Hypertension      Past Surgical History:   Procedure Laterality Date   • ORIF FEMUR FRACTURE         OT ASSESSMENT FLOWSHEET (last 12 hours)     OT Evaluation and Treatment     Row Name 02/15/23 1320                   OT Time and Intention    Subjective Information no complaints  -TS        Document Type therapy note (daily note)  -TS        Mode of Treatment occupational therapy  -TS        Patient Effort adequate  -TS        Comment R side weak, R side weak  -TS           General Information    Existing Precautions/Restrictions right;hip, posterior;fall  -TS           Pain Assessment    Pretreatment Pain Rating 0/10 - no pain  -TS        Posttreatment Pain Rating 0/10 - no pain  -TS           Activities of Daily Living    BADL Assessment/Intervention grooming;lower body dressing  -TS           Lower Body Dressing Assessment/Training    [place] Level (Lower Body Dressing) don;pants/bottoms;moderate assist (50% patient effort)  -TS        Position (Lower Body  Dressing) unsupported sitting;supported standing  -TS        Comment, (Lower Body Dressing) After STOUT showed pt what item was, he was able to lift L foot to assist STOUT in threading leg and STOUT assisted pt in lifting R foot  -TS           Grooming Assessment/Training    [place] Level (Grooming) wash face, hands;oral care regimen  -TS        Position (Grooming) unsupported sitting  -TS        Comment, (Grooming) while sitting on standard commode, pt was able to use L hand and brush teeth, STOUT provided pt with wet rag and pt brought rag to face and used both L and R hand to wash face  -TS           Bed Mobility    Bed Mobility sit-supine  -TS        Sit-Supine [place] (Bed Mobility) dependent (less than 25% patient effort)  -TS           Functional Mobility    Functional Mobility- Ind. Level contact guard assist  -TS        Functional Mobility- Device walker, front-wheeled  -TS        Functional Mobility- Comment after ambulating in august with PTA, pt then was able to ambulate into BR <>recliner in room.  -TS           Transfer Assessment/Treatment    Transfers sit-stand transfer;stand-sit transfer;toilet transfer  -TS           Sit-Stand Transfer    Sit-Stand [place] (Transfers) contact guard  -TS        Assistive Device (Sit-Stand Transfers) walker, front-wheeled  -TS           Stand-Sit Transfer    Stand-Sit [place] (Transfers) contact guard  -TS        Assistive Device (Stand-Sit Transfers) walker, front-wheeled  -TS           Toilet Transfer    Type (Toilet Transfer) sit-stand;stand-sit  -TS        [place] Level (Toilet Transfer) contact guard  -TS        Assistive Device (Toilet Transfer) commode;grab bars/safety frame;walker, front-wheeled  -TS           Wound 02/13/23 1554 Bilateral gluteal Pressure Injury    Wound - Properties Group Placement Date: 02/13/23  -MB Placement Time: 1554  -MB Present on Hospital Admission: N  -MB Side: Bilateral  -MB Location: gluteal  -MB Primary Wound  Type: Pressure inj  -MB    Retired Wound - Properties Group Placement Date: 02/13/23  -MB Placement Time: 1554  -MB Present on Hospital Admission: N  -MB Side: Bilateral  -MB Location: gluteal  -MB Primary Wound Type: Pressure inj  -MB    Retired Wound - Properties Group Date first assessed: 02/13/23  -MB Time first assessed: 1554  -MB Present on Hospital Admission: N  -MB Side: Bilateral  -MB Location: gluteal  -MB Primary Wound Type: Pressure inj  -MB       Wound 02/13/23 1556 Right anterior greater trochanter Incision    Wound - Properties Group Placement Date: 02/13/23  -MB Placement Time: 1556  -MB Present on Hospital Admission: Y  -MB Side: Right  -MB Orientation: anterior  -MB Location: greater trochanter  -MB Primary Wound Type: Incision  -MB    Retired Wound - Properties Group Placement Date: 02/13/23  -MB Placement Time: 1556  -MB Present on Hospital Admission: Y  -MB Side: Right  -MB Orientation: anterior  -MB Location: greater trochanter  -MB Primary Wound Type: Incision  -MB    Retired Wound - Properties Group Date first assessed: 02/13/23  -MB Time first assessed: 1556  -MB Present on Hospital Admission: Y  -MB Side: Right  -MB Location: greater trochanter  -MB Primary Wound Type: Incision  -MB       Plan of Care Review    Plan of Care Reviewed With patient  -TS        Progress improving  -TS        Outcome Evaluation Pt sitting EOB in room with PTA. Pt noted to be using his RUE voluntarily. Pt demonstrated some . Pt presented with RW and was able to stand and ambulate with CGA. Pt still having difficulty with expressive aphasia but was able to follow 30% of simple commands. Pt was also able to complete simple tasks when provided object. Pt showed brief and pt was able to initiate lifting of L leg to thread into pant leg. Pt provided tooth brush with a small amount of tooth paste and was able to brush his teeth with no verbal cues. Pt provided wash cloth and used both R and L hand to assist in  washing his face. Pt ambulated back to recliner and placed next to significant other. Pt then became very somnolent and began to gaze blankly at the ceiling. STOUT attempted to alert pt, pt then began to gaze to L and  on R hand increased. STOUT alerted the nurse, assisted in getting pt back to bed. Pt was left with nursing staff who then called RRT.  -TS           Positioning and Restraints    Pre-Treatment Position in bed  -TS        Post Treatment Position bed  -TS        In Bed supine;with nsg  -TS           Dressing Goal 1 (OT)    Activity/Device (Dressing Goal 1, OT) upper body dressing  -TS        [place]/Cues Needed (Dressing Goal 1, OT) minimum assist (75% or more patient effort);set-up required;verbal cues required  -TS        Time Frame (Dressing Goal 1, OT) long term goal (LTG);by discharge  -TS        Progress/Outcome (Dressing Goal 1, OT) goal not met  -TS           Grooming Goal 1 (OT)    Activity/Device (Grooming Goal 1, OT) grooming skills, all  -TS        [place] (Grooming Goal 1, OT) moderate assist (50-74% patient effort);verbal cues required;set-up required  -TS        Time Frame (Grooming Goal 1, OT) long term goal (LTG);by discharge  -TS        Strategies/Barriers (Grooming Goal 1, OT) utilizing RUE  -TS        Progress/Outcome (Grooming Goal 1, OT) goal not met  -TS           Problem Specific Goal 1 (OT)    Problem Specific Goal 1 (OT) Pt will follow 100% of 1-step commands.  -TS        Time Frame (Problem Specific Goal 1, OT) long term goal (LTG);by discharge  -TS        Progress/Outcome (Problem Specific Goal 1, OT) goal not met  -TS              User Key  (r) = Recorded By, (t) = Taken By, (c) = Cosigned By    Initials Name Effective Dates    TS Iveth Solis COTA 02/03/23 -     Raisa Hammond RN 10/14/22 -                 Occupational Therapy Education     Title: PT OT SLP Therapies (In Progress)     Topic: Occupational Therapy (In Progress)     Point: ADL training  (In Progress)     Description:   Instruct learner(s) on proper safety adaptation and remediation techniques during self care or transfers.   Instruct in proper use of assistive devices.              Learning Progress Summary           Patient Acceptance, E, NR by MM at 2/14/2023 1559    Comment: OT role, benefits, POC                   Point: Home exercise program (Not Started)     Description:   Instruct learner(s) on appropriate technique for monitoring, assisting and/or progressing therapeutic exercises/activities.              Learner Progress:  Not documented in this visit.          Point: Precautions (In Progress)     Description:   Instruct learner(s) on prescribed precautions during self-care and functional transfers.              Learning Progress Summary           Patient Acceptance, E, NR by MM at 2/14/2023 1559    Comment: OT role, benefits, POC                   Point: Body mechanics (In Progress)     Description:   Instruct learner(s) on proper positioning and spine alignment during self-care, functional mobility activities and/or exercises.              Learning Progress Summary           Patient Acceptance, E, NR by MM at 2/14/2023 2379    Comment: OT role, benefits, POC                               User Key     Initials Effective Dates Name Provider Type Discipline     06/16/21 -  Flash Garcia E, OTR/L Occupational Therapist OT                OT Recommendation and Plan     Plan of Care Review  Plan of Care Reviewed With: patient  Progress: improving  Outcome Evaluation: Pt sitting EOB in room with PTA. Pt noted to be using his RUE voluntarily. Pt demonstrated some . Pt presented with RW and was able to stand and ambulate with CGA. Pt still having difficulty with expressive aphasia but was able to follow 30% of simple commands. Pt was also able to complete simple tasks when provided object. Pt showed brief and pt was able to initiate lifting of L leg to thread into pant leg. Pt provided  tooth brush with a small amount of tooth paste and was able to brush his teeth with no verbal cues. Pt provided wash cloth and used both R and L hand to assist in washing his face. Pt ambulated back to recliner and placed next to significant other. Pt then became very somnolent and began to gaze blankly at the ceiling. STOUT attempted to alert pt, pt then began to gaze to L and  on R hand increased. STOUT alerted the nurse, assisted in getting pt back to bed. Pt was left with nursing staff who then called RRT.  Plan of Care Reviewed With: patient  Outcome Evaluation: Pt sitting EOB in room with PTA. Pt noted to be using his RUE voluntarily. Pt demonstrated some . Pt presented with RW and was able to stand and ambulate with CGA. Pt still having difficulty with expressive aphasia but was able to follow 30% of simple commands. Pt was also able to complete simple tasks when provided object. Pt showed brief and pt was able to initiate lifting of L leg to thread into pant leg. Pt provided tooth brush with a small amount of tooth paste and was able to brush his teeth with no verbal cues. Pt provided wash cloth and used both R and L hand to assist in washing his face. Pt ambulated back to recliner and placed next to significant other. Pt then became very somnolent and began to gaze blankly at the ceiling. STOUT attempted to alert pt, pt then began to gaze to L and  on R hand increased. STOUT alerted the nurse, assisted in getting pt back to bed. Pt was left with nursing staff who then called RRT.      OT Rehab Goals     Row Name 02/15/23 1320             Dressing Goal 1 (OT)    Activity/Device (Dressing Goal 1, OT) upper body dressing  -TS      [place]/Cues Needed (Dressing Goal 1, OT) minimum assist (75% or more patient effort);set-up required;verbal cues required  -TS      Time Frame (Dressing Goal 1, OT) long term goal (LTG);by discharge  -TS      Progress/Outcome (Dressing Goal 1, OT) goal not met  -TS          Grooming Goal 1 (OT)    Activity/Device (Grooming Goal 1, OT) grooming skills, all  -TS      [place] (Grooming Goal 1, OT) moderate assist (50-74% patient effort);verbal cues required;set-up required  -TS      Time Frame (Grooming Goal 1, OT) long term goal (LTG);by discharge  -TS      Strategies/Barriers (Grooming Goal 1, OT) utilizing RUE  -TS      Progress/Outcome (Grooming Goal 1, OT) goal not met  -TS         Problem Specific Goal 1 (OT)    Problem Specific Goal 1 (OT) Pt will follow 100% of 1-step commands.  -TS      Time Frame (Problem Specific Goal 1, OT) long term goal (LTG);by discharge  -TS      Progress/Outcome (Problem Specific Goal 1, OT) goal not met  -TS            User Key  (r) = Recorded By, (t) = Taken By, (c) = Cosigned By    Initials Name Provider Type Discipline    TS Iveth Solis COTA Occupational Therapist Assistant OT                    Time Calculation:    Time Calculation- OT     Row Name 02/15/23 1524 02/15/23 1516          Time Calculation- OT    OT Start Time --  -TS 1320  -TS     OT Stop Time --  -TS 1400  -TS     OT Time Calculation (min) --  -TS 40 min  -TS     Total Timed Code Minutes- OT --  -TS 40 minute(s)  -TS     OT Received On 02/15/23  -TS 02/15/23  -TS        Timed Charges    50943 - OT Self Care/Mgmt Minutes --  -TS 40  -TS        Total Minutes    Timed Charges Total Minutes --  -TS 40  -TS      Total Minutes --  -TS 40  -TS           User Key  (r) = Recorded By, (t) = Taken By, (c) = Cosigned By    Initials Name Provider Type    TS Iveth Solis COTA Occupational Therapist Assistant              Timed Therapy Charges  Total Units: 3    Suggested Charges  Total Units: 3    Procedure Name Documented Minutes Units Code    HC OT SELF CARE/MGMT/TRAIN EA 15 MIN 40  3    48490 (CPT®)               Documented Minutes  Total Minutes: 40    Therapy Provided Minutes    99410 - OT Self Care/Mgmt Minutes 40              Therapy Charges for Today     Code  Description Service Date Service Provider Modifiers Qty    [id number] HC OT SELF CARE/MGMT/TRAIN EA 15 MIN 2/15/2023 Iveth Solis COTA GO 3               OT Discharge Summary  Anticipated Discharge Disposition (OT): other (see comments)  Reason for Discharge: Patient   Outcomes Achieved: Refer to plan of care for updates on goals achieved  Discharge Destination: other (comment)    ARGELIA Jaeger  2/15/2023

## 2023-02-16 NOTE — PLAN OF CARE
Goal Outcome Evaluation:  Plan of Care Reviewed With: patient, daughter           Pt       Problem: Adult Inpatient Plan of Care  Goal: Plan of Care Review  Outcome: Unable to Meet, Plan Revised  Goal: Patient-Specific Goal (Individualized)  Outcome: Unable to Meet, Plan Revised  Goal: Absence of Hospital-Acquired Illness or Injury  Outcome: Unable to Meet, Plan Revised  Goal: Optimal Comfort and Wellbeing  Outcome: Unable to Meet, Plan Revised  Goal: Readiness for Transition of Care  Outcome: Unable to Meet, Plan Revised     Problem: Fall Injury Risk  Goal: Absence of Fall and Fall-Related Injury  Outcome: Unable to Meet, Plan Revised     Problem: Skin Injury Risk Increased  Goal: Skin Health and Integrity  Outcome: Unable to Meet, Plan Revised     Problem: Hypertension Comorbidity  Goal: Blood Pressure in Desired Range  Outcome: Unable to Meet, Plan Revised

## 2023-02-16 NOTE — PAYOR COMM NOTE
"REF: [id number]    [facility]  GARRY   [phone]  OR  FAX  [phone]       SARAH Jones (Dcsd. Male)     Date of Birth   1935    Social Security Number       Address   [address] [place] [address]    Home Phone   [phone]    MRN   [id number]       [place]   Other    Marital Status   Single                            Admission Date   23    Admission Type   Emergency    Admitting Provider   Coni Moreno MD    Attending Provider       Department, Room/Bed   [facility] 3A, 355/1       Discharge Date   2/15/2023    Discharge Disposition       Discharge Destination                               Attending Provider: (none)   Allergies: Penicillins    Isolation: None   Infection: None   Code Status: Prior    Ht: 185.4 cm (73\")   Wt: 81.6 kg (179 lb 14.4 oz)    Admission Cmt: None   Principal Problem: Cerebrovascular accident (CVA), unspecified mechanism (HCC) [I63.9]                 Active Insurance as of 2023     Primary Coverage     Payor Plan Insurance Group Employer/Plan Group    [facility] MEDICARE REPLACEMENT [facility] MEDICARE REPLACEMENT [id number]     Payor Plan Address Payor Plan Phone Number Payor Plan Fax Number Effective Dates    [address] [address]   2023 - None Entered    [facility] [id number]       Subscriber Name Subscriber Birth Date Member ID       SARAH JONES 1935 [id number]                 Emergency Contacts      (Rel.) Home Phone Work Phone Mobile Phone    Joy Kathleen (Daughter) -- -- [phone]    Macey Muniz (Friend) -- -- [phone]               Discharge Summary      Yola Marquez APRN at 02/15/23 1441            [facility] Medicine Services  DEATH SUMMARY       Date of Admission: 2023  Date of Death:  2/15/2023 at approximately 1412  Primary Care Physician: Gopi Chino MD    Presenting Problem/History of Present " Illness:  Cerebrovascular accident (CVA), unspecified mechanism (HCC) [I63.9]     Final Death Diagnoses:  Active Hospital Problems    Diagnosis    • **Cerebrovascular accident (CVA), unspecified mechanism (HCC)    • Bilateral pulmonary embolism with right heart strain (HCC)    • 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 Baird with neurology  Dr. San with cardiology.    Procedures Performed: none.    Pertinent Test Results:   Imaging Results (Last 72 Hours)     Procedure Component Value Units Date/Time    CT Head Without Contrast [[id number]] Collected: 02/15/23 1309     Updated: 02/15/23 1319    Narrative:      EXAMINATION: CT HEAD WO CONTRAST- 2/15/2023 1:09 PM CST     HISTORY: assess for hemorrhagic conversion of LEFT MCA CVA;  I63.9-Cerebral infarction, unspecified; R41.82-Altered mental status,  unspecified; R13.10-Dysphagia, unspecified; Z74.09-Other reduced  mobility; Z74.09-Other reduced mobility; Z78.9-Other specified health  status.     DOSE: 514 mGycm (Automatic exposure control technique was implemented in  an effort to keep the radiation dose as low as possible without  compromising image quality)     REPORT: Spiral CT of the head was performed without contrast,  reconstructed coronal and sagittal images are also reviewed.     COMPARISON: MRI brain without contrast 02/13/2020 3, CT head without  contrast 02/13/2023.     There is moderate edema and decreased attenuation in the distribution of  the left middle cerebral artery compatible with the known left MCA  infarction. There is patchy hemorrhagic staining within the left  (temporoparietal region, with a small focal intraparenchymal hematoma in  the left upper lobe that measures approximately 10 mm. This is  immediately review to the dense MCA sign seen on the previous CT. There  is no significant midline shift. The  ventricles and basal cisterns are  within normal limits. There are small chronic infarct in the right  posterior parietal occipital region. This is stable.       Impression:      Moderate increase in vasogenic edema in the region of the  known left MCA infarction, with associated patchy hemorrhagic staining  and a 10 mm relatively isodense intraparenchymal hematoma in the insular  cortex region of the left temporal lobe.     This report was finalized on 02/15/2023 13:16 by Dr. Jose E Barreto MD.    CT Angiogram Chest [[id number]] Collected: 02/14/23 1648     Updated: 02/14/23 1707    Narrative:      EXAMINATION: CT ANGIOGRAM CHEST-      2/14/2023 4:23 PM CST     HISTORY: Pulmonary embolism (PE) suspected, unknown D-dimer;  I63.9-Cerebral infarction, unspecified; R41.82-Altered mental status,  unspecified; R13.10-Dysphagia, unspecified; Z74.09-Other reduced  mobility; Z74.09-Other reduced mobility; Z78.9-Other specified health  status. Stroke. Decreased mobility with increased risk for DVT and PE.     In order to have a CT radiation dose as low as reasonably achievable  Automated Exposure Control was utilized for adjustment of the mA and/or  KV according to patient size.     DLP in mGycm= 299.     CT angiogram chest with and without IV contrast.     Large bilateral pulmonary emboli.  High clot burden.  Right heart strain with RV/LV ratio of 53/27.     Thoracic aortic calcification with no aneurysm.  No mediastinal mass.     Chronic lung changes with hyperexpansion.  Mild dependent atelectasis.     No pneumonia, pneumothorax, or pleural effusion.     Bilateral renal cysts with the largest on the right measuring up to 10  cm.     Summary:  1. Large bilateral pulmonary emboli.  2. High clot burden with right heart strain.  3. This report was called to Kate on 3A at 5:00 PM.                                   This report was finalized on 02/14/2023 17:04 by Dr. Pradeep Parra MD.    MRI Brain Without Contrast [[id number]]  Collected: 02/13/23 1828     Updated: 02/13/23 1834    Narrative:      EXAMINATION: MRI BRAIN WO CONTRAST-     2/13/2023 5:38 PM CST     HISTORY: Neuro deficit, acute, stroke suspected; I63.9-Cerebral  infarction, unspecified; R41.82-Altered mental status, unspecified;  R13.10-Dysphagia, unspecified.     Noncontrast MR imaging of the brain.  Axial and sagittal sequences.     Large acute left MCA infarct involving an area measuring approximately 9  x 3 cm.     Small cortical infarct in the right frontal lobe involving an area  measuring approximately 2 x 1 cm.     No hemorrhage or mass effect.  No midline shift.     Ventricle size is normal.     The visualized paranasal sinuses are clear.     There is prominent diffuse atrophy and mild small vessel disease.     Summary:  1. Large left parieto-occipital and small right frontal acute infarct.  2. No hemorrhage or midline shift.              This report was finalized on 02/13/2023 18:31 by Dr. Pradeep Parra MD.    CT Angiogram Head w AI Analysis of LVO [[id number]] Collected: 02/13/23 1009     Updated: 02/13/23 1017    Narrative:      CT ANGIOGRAM HEAD W AI ANALYSIS OF LVO- 2/13/2023 9:04 AM CST     HISTORY: Neuro deficit, acute stroke suspected, right-sided weakness  with speech impediment     COMPARISON: Nonenhanced CT head exam 02/13/2023     DOSE LENGTH PRODUCT: 161 mGy cm. Automated exposure control was also  utilized to decrease patient radiation dose.     TECHNIQUE: Axial images of the brain are performed following IV  contrast. 2-D and maximal intensity projectional reconstructed images  are reviewed. Poor contrast bolus related to poor cardiac output. Mild  motion artifact. AI analysis of LVO was utilized.        FINDINGS:  Mild contrast enhancement of the distal internal carotid  arteries with mild enhancement of the anterior and middle cerebral  arteries. A discrete central thrombus is not localized within these  vessels. There is minimal if any contrast seen  within the posterior  cerebral and small caliber basilar artery resulting in diminished  assessment. The RAPID analysis suggests decreased enhancement of the  left MCA branches.       Impression:      1. Suboptimal contrast enhancement related to poor cardiac output and a  summation artifact. Findings concerning for decreased flow to the  branches of the left MCA artery. Please refer to dictation above.  This report was finalized on 02/13/2023 10:14 by Dr. Alanna Rivera MD.    CT Angiogram Neck [[id number]] Collected: 02/13/23 1002     Updated: 02/13/23 1012    Narrative:      CT ANGIOGRAM NECK- 2/13/2023 9:04 AM CST     HISTORY: Neuro deficit, acute stroke suspected, right paralysis with  speech impediment     COMPARISON: None     DOSE LENGTH PRODUCT: 160 mGy cm. Automated exposure control was also  utilized to decrease patient radiation dose.     TECHNIQUE: Axial images of the neck are performed following IV contrast.  2-D and maximal intensity projectional reconstructed images are  reviewed.     FINDINGS:  Contrast is present within the left subclavian,  brachiocephalic veins and SVC with mild contrast suspected within the  arch of the thoracic aorta. Findings likely represent sequelae of poor  cardiac output. Patient with persistent motion. Image quality degraded  for these reasons.     There is moderate calcification within the arch of the thoracic aorta.  No significant contrast present within the common, internal, and  external carotid arteries to assess severity of carotid artery stenosis.  There is moderate calcified plaque of the left carotid bulb extending  into the proximal left internal carotid artery which results in at least  50% stenosis of the carotid bulb. Only mild calcified plaque of the  right carotid bulb. Hypoplasia suspected of the left vertebral artery.  No significant vertebral artery contrast enhancement during the exam. No  convincing aneurysm localized.     No soft tissue mass or  pathologic lymphadenopathy identified within the  neck.     Calcified granuloma the left lung apex. Moderate to advanced  degenerative change of the cervical spine.       Impression:      1. Imaging is degraded by the patient's poor cardiac output and timing  of contrast bolus as well as repetitive patient motion artifact. Poor  enhancement of the extracranial carotid and vertebral arteries during  image acquisition. Moderate calcified plaque of the left carotid bulb  resulting in at least 50% stenosis of the bulb. Only mild calcified  plaque of the right carotid bulb. Moderate atherosclerosis of the normal  caliber thoracic aorta.  This report was finalized on 02/13/2023 10:09 by Dr. Alanna Rivera MD.    CT Head Without Contrast Stroke Protocol [[id number]] Collected: 02/13/23 0951     Updated: 02/13/23 1005    Narrative:      CT HEAD WO CONTRAST STROKE PROTOCOL- 2/13/2023 9:16 AM CST     HISTORY: Neuro deficit, acute, stroke suspected, right sided paralysis  and speech impediment     COMPARISON: None     DOSE LENGTH PRODUCT: 793 mGy cm. Automated exposure control was also  utilized to decrease patient radiation dose.     TECHNIQUE: Axial images the brain are obtained without contrast     FINDINGS:  There is questionable hyperdense left M2/3 cerebral artery  which may be seen with left MCA ischemia. Questionable loss of the  gray-white matter differentiation along the left insular cortex. There  is no intracranial hemorrhage. Old ischemic changes of the right frontal  as well as right parieto-occipital region. There are chronic small  vessel ischemic changes. No extra-axial hematoma. No mass effect at this  time. Mild generalized volume loss.     Visible paranasal sinuses and mastoid air cells are well-aerated.       Impression:      1. Findings concerning for early left MCA ischemia with a hyperdense  left MCA sign and loss of gray-white matter differentiation along the  left insular cortex. No intracranial  hemorrhage or prominent mass  effect.  2. Old ischemic changes right cerebral hemisphere with chronic small  vessel ischemia.     Comments: Findings called to Dr. Bradford in the ER at 10:00 AM on  02/13/2023  This report was finalized on 02/13/2023 10:02 by Dr. Alanna Rivera MD.    XR Chest 1 View [[id number]] Collected: 02/13/23 0939     Updated: 02/13/23 0943    Narrative:      EXAMINATION: XR CHEST 1 VW- 2/13/2023 9:39 AM CST     HISTORY: Acute Stroke Protocol (Onset < 12 hrs).     REPORT: A frontal view of the chest was obtained.     COMPARISON: There are no correlative imaging studies for comparison.     The lungs are clear, mildly hypoexpanded. There is ectasia of the  thoracic aorta, exaggerated by mild rotation of the patient. Heart size  is normal. No pneumothorax or pleural effusion is identified. The  osseous structures and upper abdomen appear unremarkable.       Impression:      No acute cardiopulmonary abnormality.  This report was finalized on 02/13/2023 09:40 by Dr. Jose E Barreto MD.        Lab Results (last 72 hours)     Procedure Component Value Units Date/Time    POC Glucose Once [[id number]]  (Normal) Collected: 02/15/23 1359    Specimen: Blood Updated: 02/15/23 1420     Glucose 112 mg/dL      Comment: : [id number] Usman MorganMeter ID: [id number]       CBC (No Diff) [[id number]]  (Abnormal) Collected: 02/15/23 0637    Specimen: Blood Updated: 02/15/23 0810     WBC 11.56 10*3/mm3      RBC 3.68 10*6/mm3      Hemoglobin 13.2 g/dL      Hematocrit 39.7 %      .9 fL      MCH 35.9 pg      MCHC 33.2 g/dL      RDW 14.0 %      RDW-SD 55.7 fl      MPV 10.7 fL      Platelets 126 10*3/mm3     Comprehensive Metabolic Panel [[id number]]  (Abnormal) Collected: 02/15/23 0637    Specimen: Blood Updated: 02/15/23 0742     Glucose 109 mg/dL      BUN 25 mg/dL      Creatinine 0.88 mg/dL      Sodium 150 mmol/L      Potassium 3.0 mmol/L      Chloride 112 mmol/L      CO2 24.0 mmol/L      Calcium 8.4 mg/dL       "Total Protein 5.8 g/dL      Albumin 3.0 g/dL      ALT (SGPT) 6 U/L      AST (SGOT) 28 U/L      Alkaline Phosphatase 83 U/L      Total Bilirubin 2.4 mg/dL      Globulin 2.8 gm/dL      A/G Ratio 1.1 g/dL      BUN/Creatinine Ratio 28.4     Anion Gap 14.0 mmol/L      eGFR 84.3 mL/min/1.73     Narrative:      GFR Normal >60  Chronic Kidney Disease <60  Kidney Failure <15    The GFR formula is only valid for adults with stable renal function between ages 18 and 70.    D-dimer, Quantitative [[id number]]  (Abnormal) Collected: 02/14/23 1556    Specimen: Blood Updated: 02/14/23 1642     D-Dimer, Quantitative >20.00 MCGFEU/mL     Narrative:      According to the assay 's published package insert, a normal (<0.50 MCGFEU/mL) D-dimer result in conjunction with a non-high clinical probability assessment, excludes deep vein thrombosis (DVT) and pulmonary embolism (PE) with high sensitivity.    D-dimer values increase with age and this can make VTE exclusion of an older population difficult. To address this, the American College of [employer], based on best available evidence and recent guidelines, recommends that clinicians use age-adjusted D-dimer thresholds in patients greater than 50 years of age with: a) a low probability of PE who do not meet all Pulmonary Embolism Rule Out Criteria, or b) in those with intermediate probability of PE.   The formula for an age-adjusted D-dimer cut-off is \"age/100\".  For example, a 60 year old patient would have an age-adjusted cut-off of 0.60 MCGFEU/mL and an 80 year old 0.80 MCGFEU/mL.    POC Glucose Once [[id number]]  (Normal) Collected: 02/14/23 0844    Specimen: Blood Updated: 02/14/23 0856     Glucose 112 mg/dL      Comment: : [id number] Cornell Cannon ID: [id number]       Hemoglobin A1c [[id number]]  (Normal) Collected: 02/14/23 0507    Specimen: Blood Updated: 02/14/23 0554     Hemoglobin A1C 5.40 %     Narrative:      Hemoglobin A1C Ranges:    Increased Risk for " Diabetes  5.7% to 6.4%  Diabetes                     >= 6.5%  Diabetic Goal                < 7.0%    Lipid Panel [[id number]]  (Abnormal) Collected: 02/14/23 0507    Specimen: Blood Updated: 02/14/23 0548     Total Cholesterol 95 mg/dL      Triglycerides 111 mg/dL      HDL Cholesterol 25 mg/dL      LDL Cholesterol  49 mg/dL      VLDL Cholesterol 21 mg/dL      LDL/HDL Ratio 1.91    Narrative:      Cholesterol Reference Ranges  (U.S. Department of Health and Human Services ATP III Classifications)    Desirable          <200 mg/dL  Borderline High    200-239 mg/dL  High Risk          >240 mg/dL      Triglyceride Reference Ranges  (U.S. Department of Health and Human Services ATP III Classifications)    Normal           <150 mg/dL  Borderline High  150-199 mg/dL  High             200-499 mg/dL  Very High        >500 mg/dL    HDL Reference Ranges  (U.S. Department of Health and Human Services ATP III Classifications)    Low     <40 mg/dl (major risk factor for CHD)  High    >60 mg/dl ('negative' risk factor for CHD)        LDL Reference Ranges  (U.S. Department of Health and Human Services ATP III Classifications)    Optimal          <100 mg/dL  Near Optimal     100-129 mg/dL  Borderline High  130-159 mg/dL  High             160-189 mg/dL  Very High        >189 mg/dL    Comprehensive Metabolic Panel [[id number]]  (Abnormal) Collected: 02/14/23 0507    Specimen: Blood Updated: 02/14/23 0546     Glucose 136 mg/dL      BUN 31 mg/dL      Creatinine 1.00 mg/dL      Sodium 149 mmol/L      Potassium 3.5 mmol/L      Comment: Slight hemolysis detected by analyzer. Results may be affected.        Chloride 114 mmol/L      CO2 21.0 mmol/L      Calcium 8.0 mg/dL      Total Protein 5.4 g/dL      Albumin 3.3 g/dL      ALT (SGPT) 5 U/L      AST (SGOT) 20 U/L      Alkaline Phosphatase 79 U/L      Total Bilirubin 2.1 mg/dL      Globulin 2.1 gm/dL      A/G Ratio 1.6 g/dL      BUN/Creatinine Ratio 31.0     Anion Gap 14.0 mmol/L      eGFR 73.8  mL/min/1.73     Narrative:      GFR Normal >60  Chronic Kidney Disease <60  Kidney Failure <15    The GFR formula is only valid for adults with stable renal function between ages 18 and 70.    CBC (No Diff) [[id number]]  (Abnormal) Collected: 02/14/23 0507    Specimen: Blood Updated: 02/14/23 0532     WBC 12.78 10*3/mm3      RBC 3.78 10*6/mm3      Hemoglobin 13.6 g/dL      Hematocrit 40.9 %      .2 fL      MCH 36.0 pg      MCHC 33.3 g/dL      RDW 14.1 %      RDW-SD 54.9 fl      MPV 10.5 fL      Platelets 123 10*3/mm3     POC Glucose Once [[id number]]  (Abnormal) Collected: 02/14/23 0425    Specimen: Blood Updated: 02/14/23 0436     Glucose 133 mg/dL      Comment: : [id number] Muniz JamesMeter ID: [id number]       POC Glucose Once [[id number]]  (Normal) Collected: 02/13/23 1822    Specimen: Blood Updated: 02/13/23 1832     Glucose 120 mg/dL      Comment: : [email or web address] Bright MeganMeter ID: [id number]       [place] Draw [[id number]] Collected: 02/13/23 0902    Specimen: Blood Updated: 02/13/23 1315    Narrative:      The following orders were created for panel order [place] Draw.  Procedure                               Abnormality         Status                     ---------                               -----------         ------                     Green Top (Gel)[[id number]]                                  Final result               Lavender Top[[id number]]                                                                Red Top[[id number]]                                          Final result               Gray Top[[id number]]                                         Final result               Light Blue Top[[id number]]                                                                Please view results for these tests on the individual orders.    Grant Top [[id number]] Collected: 02/13/23 0902    Specimen: Blood Updated: 02/13/23 1315     Extra Tube Hold for add-ons.     Comment: Auto resulted.       High  Sensitivity Troponin T 2Hr [[id number]]  (Abnormal) Collected: 02/13/23 1106    Specimen: Blood Updated: 02/13/23 1138     HS Troponin T 74 ng/L      Troponin T Delta 5 ng/L     Narrative:      High Sensitive Troponin T Reference Range:  <10.0 ng/L- Negative Female for AMI  <15.0 ng/L- Negative Male for AMI  >=10 - Abnormal Female indicating possible myocardial injury.  >=15 - Abnormal Male indicating possible myocardial injury.   Clinicians would have to utilize clinical acumen, EKG, Troponin, and serial changes to determine if it is an Acute Myocardial Infarction or myocardial injury due to an underlying chronic condition.         High Sensitivity Troponin T [[id number]]  (Abnormal) Collected: 02/13/23 0902    Specimen: Blood Updated: 02/13/23 1050     HS Troponin T 69 ng/L     Narrative:      High Sensitive Troponin T Reference Range:  <10.0 ng/L- Negative Female for AMI  <15.0 ng/L- Negative Male for AMI  >=10 - Abnormal Female indicating possible myocardial injury.  >=15 - Abnormal Male indicating possible myocardial injury.   Clinicians would have to utilize clinical acumen, EKG, Troponin, and serial changes to determine if it is an Acute Myocardial Infarction or myocardial injury due to an underlying chronic condition.         Green Top (Gel) [[id number]] Collected: 02/13/23 0902    Specimen: Blood Updated: 02/13/23 1045     Extra Tube Hold for add-ons.     Comment: Auto resulted.       POC Glucose Once [[id number]]  (Normal) Collected: 02/13/23 1018    Specimen: Blood Updated: 02/13/23 1031     Glucose 127 mg/dL      Comment: : [email or web address] Gramlisch RobertMeter ID: [id number]       Red Top [[id number]] Collected: 02/13/23 0902    Specimen: Blood Updated: 02/13/23 1016     Extra Tube Hold for add-ons.     Comment: Auto resulted.       CBC & Differential [[id number]]  (Abnormal) Collected: 02/13/23 0902    Specimen: Blood Updated: 02/13/23 0950    Narrative:      The following orders were created for panel  order CBC & Differential.  Procedure                               Abnormality         Status                     ---------                               -----------         ------                     CBC Auto Differential[[id number]]        Abnormal            Final result                 Please view results for these tests on the individual orders.    CBC Auto Differential [[id number]]  (Abnormal) Collected: 02/13/23 0902    Specimen: Blood Updated: 02/13/23 0950     WBC 16.86 10*3/mm3      RBC 4.14 10*6/mm3      Hemoglobin 14.8 g/dL      Hematocrit 44.9 %      .5 fL      MCH 35.7 pg      MCHC 33.0 g/dL      RDW 14.2 %      RDW-SD 55.8 fl      MPV 10.2 fL      Platelets 163 10*3/mm3     Manual Differential [[id number]]  (Abnormal) Collected: 02/13/23 0902    Specimen: Blood Updated: 02/13/23 0950     Neutrophil % 85.1 %      Lymphocyte % 0.0 %      Monocyte % 9.9 %      Eosinophil % 1.0 %      Bands %  1.0 %      Metamyelocyte % 2.0 %      Myelocyte % 1.0 %      Neutrophils Absolute 14.52 10*3/mm3      Lymphocytes Absolute 0.00 10*3/mm3      Monocytes Absolute 1.67 10*3/mm3      Eosinophils Absolute 0.17 10*3/mm3      Anisocytosis Slight/1+     Crenated RBC's Slight/1+     Macrocytes Slight/1+     WBC Morphology Normal     Platelet Morphology Normal    Comprehensive Metabolic Panel [[id number]]  (Abnormal) Collected: 02/13/23 0902    Specimen: Blood Updated: 02/13/23 0939     Glucose 143 mg/dL      BUN 32 mg/dL      Creatinine 1.06 mg/dL      Sodium 145 mmol/L      Potassium 3.5 mmol/L      Comment: Slight hemolysis detected by analyzer. Results may be affected.        Chloride 107 mmol/L      CO2 23.0 mmol/L      Calcium 8.5 mg/dL      Total Protein 6.3 g/dL      Albumin 3.6 g/dL      ALT (SGPT) 5 U/L      AST (SGOT) 20 U/L      Alkaline Phosphatase 95 U/L      Total Bilirubin 2.5 mg/dL      Globulin 2.7 gm/dL      A/G Ratio 1.3 g/dL      BUN/Creatinine Ratio 30.2     Anion Gap 15.0 mmol/L      eGFR 68.8  mL/min/1.73     Narrative:      GFR Normal >60  Chronic Kidney Disease <60  Kidney Failure <15    The GFR formula is only valid for adults with stable renal function between ages 18 and 70.    Protime-INR [[id number]]  (Abnormal) Collected: 02/13/23 0902    Specimen: Blood Updated: 02/13/23 0928     Protime 15.7 Seconds      INR 1.24        Hospital Course:  Mr. Jones is an 87-year-old male who presented to [facility] on 2/13 with left gaze preference and right hemiparesis with altered mentation.  He is a resident at [facility] in [facility] for rehabilitation as he recently underwent right hip bipolar hemiarthroplasty on 2/1/2023 by Dr. Escalera.  No anticoagulation postop.  He was taking aspirin 81 mg daily.  He was reportedly doing well and was planned for discharge on Thursday, 2/16.  He arrived to our medical facility as a possible code stroke. A stat head CT without contrast showed hypoattenuation in the right frontal lobe.  The left MCA region may have some hypoattenuation in the gray-white matter border.  Not a candidate for tPA as he had a recent major hip surgery.  Chest x-ray stable. On room air, no chest pain, not tachycardic.  Vital signs stable.     Elevated high-sensitivity troponin 69 with follow-up 74.  Troponin T delta 5.  No chest pain.  Abnormal 2D echocardiogram and EKG.  Results for orders placed during the hospital encounter of 02/13/23     Adult Transthoracic Echo Complete W/ Cont if Necessary Per Protocol (With Agitated Saline)     Interpretation Summary  •  Left ventricular systolic function is normal. Left ventricular ejection fraction appears to be 66 - 70%.  •  The left ventricular cavity is small in size.  •  Severely reduced right ventricular systolic function noted.  •  The right ventricular cavity is severely dilated.  •  Saline test results are negative.  •  The right atrial cavity is severely  dilated.  •  Estimated right ventricular systolic pressure from  tricuspid regurgitation is normal (<35 mmHg).    Cardiology consulted.  Discussed with BETO Otero on  - CTA chest had not been obtained.  Recently underwent surgical intervention on  and was not on anticoagulation postoperatively per daughter.  He was taking aspirin 81 mg daily.  On room air, no chest pain, not tachycardic.  Vital signs stable.  D-dimer checked and greater than 20.  CTA chest showed large bilateral pulmonary emboli.  High clot burden with right heart strain.  Dr. San evaluated this, given large stroke not a candidate for EKOS procedure.      Dr. Baird with neurology evaluated patient.  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  No evidence of large vessel occlusion on CTA head neck.  He was receiving rectal aspirin.  Rizwan Mas PA-C with neurology wanted to hold off on anticoagulation until repeat CT head obtained to assure no hemorrhagic conversion.  Repeat head CT did show some hemorrhagic conversion.  Plans were to start heparin drip.  Patient was reportedly doing okay today.  Diet had been advanced per SLP.  He was up ambulating in the august with therapy when he returned to his room.  He had sudden worsening left-sided weakness, syncope and shortness of breath.  Patient rapidly progressed to having agonal breathing.  Rapid response team called.  CODE STATUS No CPR with limited support to include no intubation/cardioversion.  No intubation per daughter and wife. Dr. Moreno discussed goals of care with daughter Joy.  CODE STATUS changed to comfort measures.  Patient  at 1412.    Electronically signed by BETO Goldstein, 02/15/23, 14:51 CST.    Time: 35 minutes.      Electronically signed by Yola Marquez APRN at 02/15/23 1451       Discharge Order (From admission, onward)    None
